# Patient Record
Sex: MALE | Race: WHITE | Employment: FULL TIME | URBAN - METROPOLITAN AREA
[De-identification: names, ages, dates, MRNs, and addresses within clinical notes are randomized per-mention and may not be internally consistent; named-entity substitution may affect disease eponyms.]

---

## 2017-01-13 ENCOUNTER — TELEPHONE (OUTPATIENT)
Dept: FAMILY MEDICINE | Facility: CLINIC | Age: 37
End: 2017-01-13

## 2017-01-13 DIAGNOSIS — S83.206A RIGHT KNEE MENISCAL TEAR: Primary | ICD-10-CM

## 2017-01-13 NOTE — TELEPHONE ENCOUNTER
Pt is having surgery with Dr. Raymundo Hawkins with [facility] at [facility] and will need a referral. Thank you.

## 2017-01-16 ENCOUNTER — TELEPHONE (OUTPATIENT)
Dept: FAMILY MEDICINE | Facility: CLINIC | Age: 37
End: 2017-01-16

## 2017-01-16 NOTE — TELEPHONE ENCOUNTER
Panel Management Review      Patient has the following on his problem list:     Hypertension   Last three blood pressure readings:  BP Readings from Last 3 Encounters:   11/29/16 158/100   11/15/16 179/101   09/01/16 164/99     Blood pressure: Failed    HTN Guidelines:  Age 18-59 BP range:  Less than 140/90  Age 60-85 with Diabetes:  Less than 140/90  Age 60-85 without Diabetes:  less than 150/90      Composite cancer screening  Chart review shows that this patient is due/due soon for the following None  Summary:    Patient is due/failing the following:   BP CHECK and PHQ9    Action needed:   Routed to provider for review.    Type of outreach:    None, routed to provider for review.    Questions for provider review:    Patient failing for blood pressure, patient not on hypertension medications.  Would you like patient to have a follow up appointment for blood pressure follow up with you?  Please route back to team with appointment type needed                                                                                                                                     Rodolfo Reddy CMA       Chart routed to Provider .

## 2017-02-01 ENCOUNTER — OFFICE VISIT (OUTPATIENT)
Dept: FAMILY MEDICINE | Facility: CLINIC | Age: 37
End: 2017-02-01
Payer: COMMERCIAL

## 2017-02-01 VITALS
HEART RATE: 81 BPM | DIASTOLIC BLOOD PRESSURE: 88 MMHG | OXYGEN SATURATION: 98 % | BODY MASS INDEX: 39.89 KG/M2 | WEIGHT: 310.8 LBS | TEMPERATURE: 98.6 F | SYSTOLIC BLOOD PRESSURE: 135 MMHG

## 2017-02-01 DIAGNOSIS — Z01.818 PREOP GENERAL PHYSICAL EXAM: Primary | ICD-10-CM

## 2017-02-01 DIAGNOSIS — G43.C1 INTRACTABLE PERIODIC HEADACHE SYNDROME: ICD-10-CM

## 2017-02-01 DIAGNOSIS — R06.83 SNORING: ICD-10-CM

## 2017-02-01 PROCEDURE — 99214 OFFICE O/P EST MOD 30 MIN: CPT | Performed by: FAMILY MEDICINE

## 2017-02-01 RX ORDER — FLUTICASONE PROPIONATE 50 MCG
2 SPRAY, SUSPENSION (ML) NASAL DAILY
Qty: 1 BOTTLE | Refills: 3 | Status: SHIPPED | OUTPATIENT
Start: 2017-02-01 | End: 2018-07-15

## 2017-02-01 RX ORDER — SUMATRIPTAN 50 MG/1
50 TABLET, FILM COATED ORAL
Qty: 6 TABLET | Refills: 3 | Status: SHIPPED | OUTPATIENT
Start: 2017-02-01 | End: 2018-02-21

## 2017-02-01 ASSESSMENT — ANXIETY QUESTIONNAIRES
7. FEELING AFRAID AS IF SOMETHING AWFUL MIGHT HAPPEN: NOT AT ALL
3. WORRYING TOO MUCH ABOUT DIFFERENT THINGS: NOT AT ALL
1. FEELING NERVOUS, ANXIOUS, OR ON EDGE: NOT AT ALL
IF YOU CHECKED OFF ANY PROBLEMS ON THIS QUESTIONNAIRE, HOW DIFFICULT HAVE THESE PROBLEMS MADE IT FOR YOU TO DO YOUR WORK, TAKE CARE OF THINGS AT HOME, OR GET ALONG WITH OTHER PEOPLE: NOT DIFFICULT AT ALL
5. BEING SO RESTLESS THAT IT IS HARD TO SIT STILL: NOT AT ALL
GAD7 TOTAL SCORE: 1
6. BECOMING EASILY ANNOYED OR IRRITABLE: NOT AT ALL
2. NOT BEING ABLE TO STOP OR CONTROL WORRYING: NOT AT ALL

## 2017-02-01 ASSESSMENT — PATIENT HEALTH QUESTIONNAIRE - PHQ9: 5. POOR APPETITE OR OVEREATING: SEVERAL DAYS

## 2017-02-01 NOTE — MR AVS SNAPSHOT
After Visit Summary   2017    Connor Bowman    MRN: [id number]           Patient Information     Date Of Birth          1980        Visit Information        Provider Department      2017 1:30 PM Jordon Rothman MD [facility]        Today's Diagnoses     Preop general physical exam    -  1     Intractable periodic headache syndrome           Care Instructions      Before Your Surgery      Call your surgeon if there is any change in your health. This includes signs of a cold or flu (such as a sore throat, runny nose, cough, rash or fever).    Do not smoke, drink alcohol or take over the counter medicine (unless your surgeon or primary care doctor tells you to) for the 24 hours before and after surgery.    If you take prescribed drugs: Follow your doctor s orders about which medicines to take and which to stop until after surgery.    Eating and drinking prior to surgery: follow the instructions from your surgeon    Take a shower or bath the night before surgery. Use the soap your surgeon gave you to gently clean your skin. If you do not have soap from your surgeon, use your regular soap. Do not shave or scrub the surgery site.  Wear clean pajamas and have clean sheets on your bed.       [facility]  [address] [address]  [phone]  Dept: [phone]    PRE-OP EVALUATION:  Today's date: 2017    Connor Bowman (: 1980) presents for pre-operative evaluation assessment as requested by Dr. Raymundo Hawkins .  He requires evaluation and anesthesia risk assessment prior to undergoing surgery/procedure for treatment of right knee .  Proposed procedure: Arthroscopy    Date of Surgery/ Procedure: 17  Time of Surgery/ Procedure: 1:00  Hospital/Surgical Facility: [facility]   Fax number for surgical facility: [phone]  Primary Physician: Dudley Fontana  Type of Anesthesia Anticipated: to be  determined    Patient has a Health Care Directive or Living Will:  NO    1. NO - Do you have a history of heart attack, stroke, stent, bypass or surgery on an artery in the head, neck, heart or legs?  2. NO - Do you ever have any pain or discomfort in your chest?  3. NO - Do you have a history of  Heart Failure?  4. NO - Are you troubled by shortness of breath when: walking on the level, up a slight hill or at night?  5. NO - Do you currently have a cold, bronchitis or other respiratory infection?  6. NO - Do you have a cough, shortness of breath or wheezing?  7. NO - Do you sometimes get pains in the calves of your legs when you walk?  8. NO - Do you or anyone in your family have previous history of blood clots?  9. NO - Do you or does anyone in your family have a serious bleeding problem such as prolonged bleeding following surgeries or cuts?  10. NO - Have you ever had problems with anemia or been told to take iron pills?  11. NO - Have you had any abnormal blood loss such as black, tarry or bloody stools, or abnormal vaginal bleeding?  12. NO - Have you ever had a blood transfusion?  13. NO - Have you or any of your relatives ever had problems with anesthesia?  14. NO - Do you have sleep apnea, excessive snoring or daytime drowsiness?  15. NO - Do you have any prosthetic heart valves?  16. NO - Do you have prosthetic joints?  17. NO - Is there any chance that you may be pregnant?      HPI:                                                      Brief HPI related to upcoming procedure: right medial knee pain      HYPERTENSION - Patient has longstanding history of mod-severe HTN , currently denies any symptoms referable to elevated blood pressure. Specifically denies chest pain, palpitations, dyspnea, orthopnea, PND or peripheral edema. Blood pressure readings have been in normal range. He is losing weight and exercises                                                                                                             .    MEDICAL HISTORY:                                                      Patient Active Problem List    Diagnosis Date Noted     Tear of medial meniscus of right knee, current, unspecified tear type, subsequent encounter 12/05/2016     Priority: Medium     Derangement of posterior horn of medial meniscus 02/15/2012     Priority: Medium     Migraines 01/02/2012     Priority: Medium     Knee pain 01/02/2012     Priority: Medium     CARDIOVASCULAR SCREENING; LDL GOAL LESS THAN 130 10/31/2010     Priority: Medium     Morbid obesity (H) 04/29/2010     Priority: Medium     Hypertension, goal below 140/90      Priority: Medium     Adiposity 02/16/2007     Priority: Medium      Past Medical History   Diagnosis Date     GERD (gastroesophageal reflux disease)      Unspecified essential hypertension      Past Surgical History   Procedure Laterality Date     Gi surgery  5/2010     Gastric band     Knee surgery       arthroscopic     Current Outpatient Prescriptions   Medication Sig Dispense Refill     aspirin 325 MG tablet Take 325 mg by mouth       fluticasone (FLONASE) 50 MCG/ACT nasal spray        SUMAtriptan (IMITREX) 50 MG tablet Take 1 tablet (50 mg) by mouth at onset of headache for migraine May repeat dose in 2 hours.  Do not exceed 200 mg in 24 hours 6 tablet 1     OTC products: None, except as noted above    No Known Allergies   Latex Allergy: NO    Social History   Substance Use Topics     Smoking status: Never Smoker      Smokeless tobacco: Never Used     Alcohol Use: No     History   Drug Use No       REVIEW OF SYSTEMS:                                                    C: NEGATIVE for fever, chills, change in weight  I: NEGATIVE for worrisome rashes, moles or lesions  E: NEGATIVE for vision changes or irritation  E/M: NEGATIVE for ear, mouth and throat problems  R: NEGATIVE for significant cough or SOB  B: NEGATIVE for masses, tenderness or discharge  CV: NEGATIVE for chest pain, palpitations or  peripheral edema  GI: NEGATIVE for nausea, abdominal pain, heartburn, or change in bowel habits  : NEGATIVE for frequency, dysuria, or hematuria  M: NEGATIVE for significant arthralgias or myalgia  N: NEGATIVE for weakness, dizziness or paresthesias  E: NEGATIVE for temperature intolerance, skin/hair changes  H: NEGATIVE for bleeding problems  P: NEGATIVE for changes in mood or affect    EXAM:                                                    /88 mmHg  Pulse 81  Temp(Src) 98.6  F (37  C) (Oral)  Wt 310 lb 12.8 oz (140.978 kg)  SpO2 98%    GENERAL APPEARANCE: healthy, alert and no distress     EYES: EOMI, - PERRL     HENT: ear canals and TM's normal and nose and mouth without ulcers or lesions     NECK: no adenopathy, no asymmetry, masses, or scars and thyroid normal to palpation     RESP: lungs clear to auscultation - no rales, rhonchi or wheezes     CV: regular rates and rhythm, normal S1 S2, no S3 or S4 and no murmur, click or rub -     ABDOMEN:  soft, nontender, no HSM or masses and bowel sounds normal     MS: extremities normal- no gross deformities noted, no evidence of inflammation in joints, FROM in all extremities.     SKIN: no suspicious lesions or rashes     NEURO: Normal strength and tone, sensory exam grossly normal, mentation intact and speech normal     PSYCH: mentation appears normal. and affect normal/bright     LYMPHATICS: No axillary, cervical, inguinal, or supraclavicular nodes    DIAGNOSTICS:                                                    No labs or EKG required for low risk surgery (cataract, skin procedure, breast biopsy, etc)    Recent Labs   Lab Test  02/03/12   1448  10/31/11   1524  04/21/10   1536   HGB   --   16.1  16.1   PLT   --   257   --    NA  141  138   --    POTASSIUM  4.7  3.9   --    CR  1.02  1.03   --         IMPRESSION:                                                    Reason for surgery/procedure: right MMT  Diagnosis/reason for consult: Anesthesia     The  proposed surgical procedure is considered LOW risk.    REVISED CARDIAC RISK INDEX  The patient has the following serious cardiovascular risks for perioperative complications such as (MI, PE, VFib and 3  AV Block):  No serious cardiac risks  INTERPRETATION: 0 risks: Class I (very low risk - 0.4% complication rate)    The patient has the following additional risks for perioperative complications:  No identified additional risks      ICD-10-CM    1. Preop general physical exam Z01.818    2. Intractable periodic headache syndrome G43.C1 SUMAtriptan (IMITREX) 50 MG tablet       RECOMMENDATIONS:                                                           --Patient is to take all scheduled medications on the day of surgery EXCEPT for modifications listed below.    APPROVAL GIVEN to proceed with proposed procedure, without further diagnostic evaluation       Signed Electronically by: Jordon Rothman MD    Copy of this evaluation report is provided to requesting physician.    [facility] Preop Guidelines          Follow-ups after your visit        Who to contact     If you have questions or need follow up information about today's clinic visit or your schedule please contact [facility] directly at [phone].  Normal or non-critical lab and imaging results will be communicated to you by [facility], letter or phone within 4 business days after the clinic has received the results. If you do not hear from us within 7 days, please contact the clinic through [facility] or phone. If you have a critical or abnormal lab result, we will notify you by phone as soon as possible.  Submit refill requests through [facility] or call your pharmacy and they will forward the refill request to us. Please allow 3 business days for your refill to be completed.          Additional Information About Your Visit        [employer] Information     [employer] lets you send messages to your doctor, view your test results, renew your prescriptions,  "schedule appointments and more. To sign up, go to [email or web address] . Click on \"Log in\" on the left side of the screen, which will take you to the Welcome page. Then click on \"Sign up Now\" on the right side of the page.     You will be asked to enter the access code listed below, as well as some personal information. Please follow the directions to create your username and password.     Your access code is: [email or web address]  Expires: 2017  8:58 AM     Your access code will  in 90 days. If you need help or a new code, please call your [facility] clinic or [phone].        Care EveryWhere ID     This is your Care EveryWhere ID. This could be used by other organizations to access your [facility] medical records  [id number]        Your Vitals Were     Pulse Temperature Pulse Oximetry             81 98.6  F (37  C) (Oral) 98%          Blood Pressure from Last 3 Encounters:   17 135/88   16 158/100   11/15/16 179/101    Weight from Last 3 Encounters:   17 310 lb 12.8 oz (140.978 kg)   16 320 lb (145.151 kg)   11/15/16 320 lb (145.151 kg)              Today, you had the following     No orders found for display       Primary Care Provider Office Phone # Fax #    Dudley Fontana [phone]       [facility] [address] [place] [address]        Thank you!     Thank you for choosing [facility]  for your care. Our goal is always to provide you with excellent care. Hearing back from our patients is one way we can continue to improve our services. Please take a few minutes to complete the written survey that you may receive in the mail after your visit with us. Thank you!             Your Updated Medication List - Protect others around you: Learn how to safely use, store and throw away your medicines at www.disposemymeds.org.          This list is accurate as of: 17  1:58 PM.  Always use your most recent med list.                   " Brand Name Dispense Instructions for use    aspirin 325 MG tablet      Take 325 mg by mouth       FLONASE 50 MCG/ACT spray   Generic drug:  fluticasone          SUMAtriptan 50 MG tablet    IMITREX    6 tablet    Take 1 tablet (50 mg) by mouth at onset of headache for migraine May repeat dose in 2 hours.  Do not exceed 200 mg in 24 hours

## 2017-02-01 NOTE — PROGRESS NOTES
[facility]  [address] [facility] [address]  [phone]  Dept: [phone]    PRE-OP EVALUATION:  Today's date: 2017    Connor Bowman (: 1980) presents for pre-operative evaluation assessment as requested by Dr. Raymundo Hawkins .  He requires evaluation and anesthesia risk assessment prior to undergoing surgery/procedure for treatment of right knee .  Proposed procedure: Arthroscopy    Date of Surgery/ Procedure: 17  Time of Surgery/ Procedure: 1:00  Hospital/Surgical Facility: [facility]   Fax number for surgical facility: [phone]  Primary Physician: Dudley Fontana  Type of Anesthesia Anticipated: to be determined    Patient has a Health Care Directive or Living Will:  NO    1. NO - Do you have a history of heart attack, stroke, stent, bypass or surgery on an artery in the head, neck, heart or legs?  2. NO - Do you ever have any pain or discomfort in your chest?  3. NO - Do you have a history of  Heart Failure?  4. NO - Are you troubled by shortness of breath when: walking on the level, up a slight hill or at night?  5. NO - Do you currently have a cold, bronchitis or other respiratory infection?  6. NO - Do you have a cough, shortness of breath or wheezing?  7. NO - Do you sometimes get pains in the calves of your legs when you walk?  8. NO - Do you or anyone in your family have previous history of blood clots?  9. NO - Do you or does anyone in your family have a serious bleeding problem such as prolonged bleeding following surgeries or cuts?  10. NO - Have you ever had problems with anemia or been told to take iron pills?  11. NO - Have you had any abnormal blood loss such as black, tarry or bloody stools, or abnormal vaginal bleeding?  12. NO - Have you ever had a blood transfusion?  13. NO - Have you or any of your relatives ever had problems with anesthesia?  14. NO - Do you have sleep apnea, excessive snoring or daytime drowsiness?  15. NO - Do  you have any prosthetic heart valves?  16. NO - Do you have prosthetic joints?  17. NO - Is there any chance that you may be pregnant?      HPI:                                                      Brief HPI related to upcoming procedure: right medial knee pain      HYPERTENSION - Patient has longstanding history of mod-severe HTN , currently denies any symptoms referable to elevated blood pressure. Specifically denies chest pain, palpitations, dyspnea, orthopnea, PND or peripheral edema. Blood pressure readings have been in normal range. He is losing weight and exercises                                                                                                            .    MEDICAL HISTORY:                                                      Patient Active Problem List    Diagnosis Date Noted     Tear of medial meniscus of right knee, current, unspecified tear type, subsequent encounter 12/05/2016     Priority: Medium     Derangement of posterior horn of medial meniscus 02/15/2012     Priority: Medium     Migraines 01/02/2012     Priority: Medium     Knee pain 01/02/2012     Priority: Medium     CARDIOVASCULAR SCREENING; LDL GOAL LESS THAN 130 10/31/2010     Priority: Medium     Morbid obesity (H) 04/29/2010     Priority: Medium     Hypertension, goal below 140/90      Priority: Medium     Adiposity 02/16/2007     Priority: Medium      Past Medical History   Diagnosis Date     GERD (gastroesophageal reflux disease)      Unspecified essential hypertension      Past Surgical History   Procedure Laterality Date     Gi surgery  5/2010     Gastric band     Knee surgery       arthroscopic     Current Outpatient Prescriptions   Medication Sig Dispense Refill     aspirin 325 MG tablet Take 325 mg by mouth       fluticasone (FLONASE) 50 MCG/ACT nasal spray        SUMAtriptan (IMITREX) 50 MG tablet Take 1 tablet (50 mg) by mouth at onset of headache for migraine May repeat dose in 2 hours.  Do not exceed 200 mg in 24  hours 6 tablet 1     OTC products: None, except as noted above    No Known Allergies   Latex Allergy: NO    Social History   Substance Use Topics     Smoking status: Never Smoker      Smokeless tobacco: Never Used     Alcohol Use: No     History   Drug Use No       REVIEW OF SYSTEMS:                                                    C: NEGATIVE for fever, chills, change in weight  I: NEGATIVE for worrisome rashes, moles or lesions  E: NEGATIVE for vision changes or irritation  E/M: NEGATIVE for ear, mouth and throat problems  R: NEGATIVE for significant cough or SOB  B: NEGATIVE for masses, tenderness or discharge  CV: NEGATIVE for chest pain, palpitations or peripheral edema  GI: NEGATIVE for nausea, abdominal pain, heartburn, or change in bowel habits  : NEGATIVE for frequency, dysuria, or hematuria  M: NEGATIVE for significant arthralgias or myalgia  N: NEGATIVE for weakness, dizziness or paresthesias  E: NEGATIVE for temperature intolerance, skin/hair changes  H: NEGATIVE for bleeding problems  P: NEGATIVE for changes in mood or affect    EXAM:                                                    /88 mmHg  Pulse 81  Temp(Src) 98.6  F (37  C) (Oral)  Wt 310 lb 12.8 oz (140.978 kg)  SpO2 98%    GENERAL APPEARANCE: healthy, alert and no distress     EYES: EOMI, - PERRL     HENT: ear canals and TM's normal and nose and mouth without ulcers or lesions     NECK: no adenopathy, no asymmetry, masses, or scars and thyroid normal to palpation     RESP: lungs clear to auscultation - no rales, rhonchi or wheezes     CV: regular rates and rhythm, normal S1 S2, no S3 or S4 and no murmur, click or rub -     ABDOMEN:  soft, nontender, no HSM or masses and bowel sounds normal     MS: extremities normal- no gross deformities noted, no evidence of inflammation in joints, FROM in all extremities.     SKIN: no suspicious lesions or rashes     NEURO: Normal strength and tone, sensory exam grossly normal, mentation intact  and speech normal     PSYCH: mentation appears normal. and affect normal/bright     LYMPHATICS: No axillary, cervical, inguinal, or supraclavicular nodes    DIAGNOSTICS:                                                    No labs or EKG required for low risk surgery (cataract, skin procedure, breast biopsy, etc)    Recent Labs   Lab Test  02/03/12   1448  10/31/11   1524  04/21/10   1536   HGB   --   16.1  16.1   PLT   --   257   --    NA  141  138   --    POTASSIUM  4.7  3.9   --    CR  1.02  1.03   --         IMPRESSION:                                                    Reason for surgery/procedure: right MMT  Diagnosis/reason for consult: Anesthesia     The proposed surgical procedure is considered LOW risk.    REVISED CARDIAC RISK INDEX  The patient has the following serious cardiovascular risks for perioperative complications such as (MI, PE, VFib and 3  AV Block):  No serious cardiac risks  INTERPRETATION: 0 risks: Class I (very low risk - 0.4% complication rate)    The patient has the following additional risks for perioperative complications:  No identified additional risks      ICD-10-CM    1. Preop general physical exam Z01.818    2. Intractable periodic headache syndrome G43.C1 SUMAtriptan (IMITREX) 50 MG tablet       RECOMMENDATIONS:                                                           --Patient is to take all scheduled medications on the day of surgery EXCEPT for modifications listed below.    APPROVAL GIVEN to proceed with proposed procedure, without further diagnostic evaluation       Signed Electronically by: Jordon Rothman MD    Copy of this evaluation report is provided to requesting physician.    [facility] Preop Guidelines

## 2017-02-01 NOTE — PATIENT INSTRUCTIONS
Before Your Surgery      Call your surgeon if there is any change in your health. This includes signs of a cold or flu (such as a sore throat, runny nose, cough, rash or fever).    Do not smoke, drink alcohol or take over the counter medicine (unless your surgeon or primary care doctor tells you to) for the 24 hours before and after surgery.    If you take prescribed drugs: Follow your doctor s orders about which medicines to take and which to stop until after surgery.    Eating and drinking prior to surgery: follow the instructions from your surgeon    Take a shower or bath the night before surgery. Use the soap your surgeon gave you to gently clean your skin. If you do not have soap from your surgeon, use your regular soap. Do not shave or scrub the surgery site.  Wear clean pajamas and have clean sheets on your bed.       [facility]  [id number] [place] [id number]  [phone]  Dept: [phone]    PRE-OP EVALUATION:  Today's date: 2017    Connor Bowman (: 1980) presents for pre-operative evaluation assessment as requested by Dr. Raymundo Hawkins .  He requires evaluation and anesthesia risk assessment prior to undergoing surgery/procedure for treatment of right knee .  Proposed procedure: Arthroscopy    Date of Surgery/ Procedure: 17  Time of Surgery/ Procedure: 1:00  Hospital/Surgical Facility: [facility]   Fax number for surgical facility: [phone]  Primary Physician: Dudley Fontana  Type of Anesthesia Anticipated: to be determined    Patient has a Health Care Directive or Living Will:  NO    1. NO - Do you have a history of heart attack, stroke, stent, bypass or surgery on an artery in the head, neck, heart or legs?  2. NO - Do you ever have any pain or discomfort in your chest?  3. NO - Do you have a history of  Heart Failure?  4. NO - Are you troubled by shortness of breath when: walking on the level, up a slight hill or at night?  5. NO - Do  you currently have a cold, bronchitis or other respiratory infection?  6. NO - Do you have a cough, shortness of breath or wheezing?  7. NO - Do you sometimes get pains in the calves of your legs when you walk?  8. NO - Do you or anyone in your family have previous history of blood clots?  9. NO - Do you or does anyone in your family have a serious bleeding problem such as prolonged bleeding following surgeries or cuts?  10. NO - Have you ever had problems with anemia or been told to take iron pills?  11. NO - Have you had any abnormal blood loss such as black, tarry or bloody stools, or abnormal vaginal bleeding?  12. NO - Have you ever had a blood transfusion?  13. NO - Have you or any of your relatives ever had problems with anesthesia?  14. NO - Do you have sleep apnea, excessive snoring or daytime drowsiness?  15. NO - Do you have any prosthetic heart valves?  16. NO - Do you have prosthetic joints?  17. NO - Is there any chance that you may be pregnant?      HPI:                                                      Brief HPI related to upcoming procedure: right medial knee pain      HYPERTENSION - Patient has longstanding history of mod-severe HTN , currently denies any symptoms referable to elevated blood pressure. Specifically denies chest pain, palpitations, dyspnea, orthopnea, PND or peripheral edema. Blood pressure readings have been in normal range. He is losing weight and exercises                                                                                                            .    MEDICAL HISTORY:                                                      Patient Active Problem List    Diagnosis Date Noted     Tear of medial meniscus of right knee, current, unspecified tear type, subsequent encounter 12/05/2016     Priority: Medium     Derangement of posterior horn of medial meniscus 02/15/2012     Priority: Medium     Migraines 01/02/2012     Priority: Medium     Knee pain 01/02/2012     Priority:  Medium     CARDIOVASCULAR SCREENING; LDL GOAL LESS THAN 130 10/31/2010     Priority: Medium     Morbid obesity (H) 04/29/2010     Priority: Medium     Hypertension, goal below 140/90      Priority: Medium     Adiposity 02/16/2007     Priority: Medium      Past Medical History   Diagnosis Date     GERD (gastroesophageal reflux disease)      Unspecified essential hypertension      Past Surgical History   Procedure Laterality Date     Gi surgery  5/2010     Gastric band     Knee surgery       arthroscopic     Current Outpatient Prescriptions   Medication Sig Dispense Refill     aspirin 325 MG tablet Take 325 mg by mouth       fluticasone (FLONASE) 50 MCG/ACT nasal spray        SUMAtriptan (IMITREX) 50 MG tablet Take 1 tablet (50 mg) by mouth at onset of headache for migraine May repeat dose in 2 hours.  Do not exceed 200 mg in 24 hours 6 tablet 1     OTC products: None, except as noted above    No Known Allergies   Latex Allergy: NO    Social History   Substance Use Topics     Smoking status: Never Smoker      Smokeless tobacco: Never Used     Alcohol Use: No     History   Drug Use No       REVIEW OF SYSTEMS:                                                    C: NEGATIVE for fever, chills, change in weight  I: NEGATIVE for worrisome rashes, moles or lesions  E: NEGATIVE for vision changes or irritation  E/M: NEGATIVE for ear, mouth and throat problems  R: NEGATIVE for significant cough or SOB  B: NEGATIVE for masses, tenderness or discharge  CV: NEGATIVE for chest pain, palpitations or peripheral edema  GI: NEGATIVE for nausea, abdominal pain, heartburn, or change in bowel habits  : NEGATIVE for frequency, dysuria, or hematuria  M: NEGATIVE for significant arthralgias or myalgia  N: NEGATIVE for weakness, dizziness or paresthesias  E: NEGATIVE for temperature intolerance, skin/hair changes  H: NEGATIVE for bleeding problems  P: NEGATIVE for changes in mood or affect    EXAM:                                                     /88 mmHg  Pulse 81  Temp(Src) 98.6  F (37  C) (Oral)  Wt 310 lb 12.8 oz (140.978 kg)  SpO2 98%    GENERAL APPEARANCE: healthy, alert and no distress     EYES: EOMI, - PERRL     HENT: ear canals and TM's normal and nose and mouth without ulcers or lesions     NECK: no adenopathy, no asymmetry, masses, or scars and thyroid normal to palpation     RESP: lungs clear to auscultation - no rales, rhonchi or wheezes     CV: regular rates and rhythm, normal S1 S2, no S3 or S4 and no murmur, click or rub -     ABDOMEN:  soft, nontender, no HSM or masses and bowel sounds normal     MS: extremities normal- no gross deformities noted, no evidence of inflammation in joints, FROM in all extremities.     SKIN: no suspicious lesions or rashes     NEURO: Normal strength and tone, sensory exam grossly normal, mentation intact and speech normal     PSYCH: mentation appears normal. and affect normal/bright     LYMPHATICS: No axillary, cervical, inguinal, or supraclavicular nodes    DIAGNOSTICS:                                                    No labs or EKG required for low risk surgery (cataract, skin procedure, breast biopsy, etc)    Recent Labs   Lab Test  02/03/12   1448  10/31/11   1524  04/21/10   1536   HGB   --   16.1  16.1   PLT   --   257   --    NA  141  138   --    POTASSIUM  4.7  3.9   --    CR  1.02  1.03   --         IMPRESSION:                                                    Reason for surgery/procedure: right MMT  Diagnosis/reason for consult: Anesthesia     The proposed surgical procedure is considered LOW risk.    REVISED CARDIAC RISK INDEX  The patient has the following serious cardiovascular risks for perioperative complications such as (MI, PE, VFib and 3  AV Block):  No serious cardiac risks  INTERPRETATION: 0 risks: Class I (very low risk - 0.4% complication rate)    The patient has the following additional risks for perioperative complications:  No identified additional risks       ICD-10-CM    1. Preop general physical exam Z01.818    2. Intractable periodic headache syndrome G43.C1 SUMAtriptan (IMITREX) 50 MG tablet       RECOMMENDATIONS:                                                           --Patient is to take all scheduled medications on the day of surgery EXCEPT for modifications listed below.    APPROVAL GIVEN to proceed with proposed procedure, without further diagnostic evaluation       Signed Electronically by: Jorodn Rothman MD    Copy of this evaluation report is provided to requesting physician.    [facility] Preop Guidelines

## 2017-02-02 ASSESSMENT — PATIENT HEALTH QUESTIONNAIRE - PHQ9: SUM OF ALL RESPONSES TO PHQ QUESTIONS 1-9: 0

## 2017-02-02 ASSESSMENT — ANXIETY QUESTIONNAIRES: GAD7 TOTAL SCORE: 1

## 2017-09-17 ENCOUNTER — OFFICE VISIT (OUTPATIENT)
Dept: URGENT CARE | Facility: RETAIL CLINIC | Age: 37
End: 2017-09-17
Payer: COMMERCIAL

## 2017-09-17 VITALS
HEART RATE: 93 BPM | OXYGEN SATURATION: 97 % | SYSTOLIC BLOOD PRESSURE: 154 MMHG | DIASTOLIC BLOOD PRESSURE: 95 MMHG | TEMPERATURE: 98.8 F

## 2017-09-17 DIAGNOSIS — J01.90 ACUTE SINUSITIS WITH SYMPTOMS > 10 DAYS: Primary | ICD-10-CM

## 2017-09-17 DIAGNOSIS — R03.0 ELEVATED BLOOD PRESSURE READING WITHOUT DIAGNOSIS OF HYPERTENSION: ICD-10-CM

## 2017-09-17 DIAGNOSIS — R05.9 COUGH: ICD-10-CM

## 2017-09-17 PROCEDURE — 99203 OFFICE O/P NEW LOW 30 MIN: CPT | Performed by: PHYSICIAN ASSISTANT

## 2017-09-17 RX ORDER — FLUTICASONE PROPIONATE 50 MCG
2 SPRAY, SUSPENSION (ML) NASAL DAILY
Qty: 1 BOTTLE | Refills: 6 | Status: SHIPPED | OUTPATIENT
Start: 2017-09-17 | End: 2018-09-18

## 2017-09-17 RX ORDER — CODEINE PHOSPHATE AND GUAIFENESIN 10; 100 MG/5ML; MG/5ML
1-2 SOLUTION ORAL EVERY 6 HOURS PRN
Qty: 120 ML | Refills: 0 | Status: SHIPPED | OUTPATIENT
Start: 2017-09-17 | End: 2017-10-05

## 2017-09-17 NOTE — PATIENT INSTRUCTIONS
Take antibiotic as directed  Cough codeine syrup as directed  Flonase steroid nasal spray daily as needed  Apply warm facial compresses/packs for 5-10 minutes three times daily.  Drink plenty of fluids- 6 to 10 glasses of liquids each day. Rest.  Saline drops or nasal sprays as needed.   Steam treatments or humidifier.  Sudafed (decongestant) for congestion.  Mucinex (guaifenesin) to thin out secretions.  Tylenol or ibuprofen as needed for pain or fever  Follow up at your primary care clinic for increasing pain, high fever, vision changes, worsening symptoms, or no relief from symptoms after 7-10 days.  Please follow up with primary care provider if not improving, worsening or new symptoms or for any adverse reactions to medications.     Recheck blood pressure at home in 1-2 weeks  Monitor   Goal <140/<90  Perfect Goal <120/<80

## 2017-09-17 NOTE — NURSING NOTE
"Chief Complaint   Patient presents with     Cough     2 weeks; hurts in head when coughing     Sinus Problem     Headache     constant sinus head pressure; 1 week     Plugged Ears     feels like he is under water       Initial BP (!) 154/95 (BP Location: Left arm)  Pulse 93  Temp 98.8  F (37.1  C) (Oral)  SpO2 97% Estimated body mass index is 39.9 kg/(m^2) as calculated from the following:    Height as of 11/29/16: 6' 2\" (1.88 m).    Weight as of 2/1/17: 310 lb 12.8 oz (141 kg).  Medication Reconciliation: complete  "

## 2017-09-17 NOTE — MR AVS SNAPSHOT
"              After Visit Summary   9/17/2017    Connor Bowman    MRN: [id number]           Patient Information     Date Of Birth          1980        Visit Information        Provider Department      9/17/2017 1:50 PM Taylor Ayala PA-C [facility]        Today's Diagnoses     Acute sinusitis with symptoms > 10 days    -  1    Cough          Care Instructions    Take antibiotic as directed  Cough codeine syrup as directed  Flonase steroid nasal spray daily as needed  Apply warm facial compresses/packs for 5-10 minutes three times daily.  Drink plenty of fluids- 6 to 10 glasses of liquids each day. Rest.  Saline drops or nasal sprays as needed.   Steam treatments or humidifier.  Sudafed (decongestant) for congestion.  Mucinex (guaifenesin) to thin out secretions.  Tylenol or ibuprofen as needed for pain or fever  Follow up at your primary care clinic for increasing pain, high fever, vision changes, worsening symptoms, or no relief from symptoms after 7-10 days.  Please follow up with primary care provider if not improving, worsening or new symptoms or for any adverse reactions to medications.     Recheck blood pressure at home in 1-2 weeks  Monitor   Goal <140/<90  Perfect Goal <120/<80                  Follow-ups after your visit        Who to contact     You can reach your care team any time of the day by calling [phone].  Notification of test results:  If you have an abnormal lab result, we will notify you by phone as soon as possible.         Additional Information About Your Visit        MyChart Information     [employer] lets you send messages to your doctor, view your test results, renew your prescriptions, schedule appointments and more. To sign up, go to [email or web address] . Click on \"Log in\" on the left side of the screen, which will take you to the Welcome page. Then click on \"Sign up Now\" on the right side of the page.     You will be asked to enter the access " code listed below, as well as some personal information. Please follow the directions to create your username and password.     Your access code is: [email or web address]  Expires: 2017  2:31 PM     Your access code will  in 90 days. If you need help or a new code, please call your [facility] clinic or [phone].        Care EveryWhere ID     This is your Care EveryWhere ID. This could be used by other organizations to access your [facility] medical records  [id number]        Your Vitals Were     Pulse Temperature Pulse Oximetry             93 98.8  F (37.1  C) (Oral) 97%          Blood Pressure from Last 3 Encounters:   17 (!) 154/95   17 135/88   16 (!) 158/100    Weight from Last 3 Encounters:   17 (!) 310 lb 12.8 oz (141 kg)   16 (!) 320 lb (145.2 kg)   11/15/16 (!) 320 lb (145.2 kg)              Today, you had the following     No orders found for display         Today's Medication Changes          These changes are accurate as of: 17  2:31 PM.  If you have any questions, ask your nurse or doctor.               Start taking these medicines.        Dose/Directions    amoxicillin-clavulanate 875-125 MG per tablet   Commonly known as:  AUGMENTIN   Used for:  Acute sinusitis with symptoms > 10 days        Dose:  1 tablet   Take 1 tablet by mouth 2 times daily for 10 days   Quantity:  20 tablet   Refills:  0       guaiFENesin-codeine 100-10 MG/5ML Soln solution   Commonly known as:  ROBITUSSIN AC   Used for:  Cough        Dose:  1-2 tsp.   Take 5-10 mLs by mouth every 6 hours as needed for cough   Quantity:  120 mL   Refills:  0         These medicines have changed or have updated prescriptions.        Dose/Directions    * fluticasone 50 MCG/ACT spray   Commonly known as:  FLONASE   This may have changed:  Another medication with the same name was added. Make sure you understand how and when to take each.   Used for:  Snoring        Dose:  2 spray   Spray 2 sprays into both  nostrils daily   Quantity:  1 Bottle   Refills:  3       * fluticasone 50 MCG/ACT spray   Commonly known as:  FLONASE   This may have changed:  You were already taking a medication with the same name, and this prescription was added. Make sure you understand how and when to take each.   Used for:  Acute sinusitis with symptoms > 10 days        Dose:  2 spray   Spray 2 sprays into both nostrils daily   Quantity:  1 Bottle   Refills:  6       * Notice:  This list has 2 medication(s) that are the same as other medications prescribed for you. Read the directions carefully, and ask your doctor or other care provider to review them with you.         Where to get your medicines      These medications were sent to [id number] - [place], [place] - [address] [facility]  [address] [facility] [address]     Phone:  [phone]     amoxicillin-clavulanate 875-125 MG per tablet    fluticasone 50 MCG/ACT spray         Some of these will need a paper prescription and others can be bought over the counter.  Ask your nurse if you have questions.     Bring a paper prescription for each of these medications     guaiFENesin-codeine 100-10 MG/5ML Soln solution                Primary Care Provider Office Phone # Fax #    Dudley Fontana [phone] [address] [place] [address]        Equal Access to Services     FLORENTINO TELLO AH: Hadii nay hinds hadasho Soomaali, waaxda luqadaha, qaybta kaalmada adeegyada, tami marquis haymarni cortez. So [facility] [phone].    ATENCIÓN: Si habla español, tiene a winn disposición servicios gratuitos de asistencia lingüística. Llame al [phone].    We comply with applicable federal civil rights laws and [place] laws. We do not discriminate on the basis of race, color, national origin, age, disability sex, sexual orientation or gender identity.            Thank you!     Thank you for choosing [facility]  for your care. Our goal  is always to provide you with excellent care. Hearing back from our patients is one way we can continue to improve our services. Please take a few minutes to complete the written survey that you may receive in the mail after your visit with us. Thank you!             Your Updated Medication List - Protect others around you: Learn how to safely use, store and throw away your medicines at www.disposemymeds.org.          This list is accurate as of: 9/17/17  2:31 PM.  Always use your most recent med list.                   Brand Name Dispense Instructions for use Diagnosis    amoxicillin-clavulanate 875-125 MG per tablet    AUGMENTIN    20 tablet    Take 1 tablet by mouth 2 times daily for 10 days    Acute sinusitis with symptoms > 10 days       aspirin 325 MG tablet      Take 325 mg by mouth        * fluticasone 50 MCG/ACT spray    FLONASE    1 Bottle    Spray 2 sprays into both nostrils daily    Snoring       * fluticasone 50 MCG/ACT spray    FLONASE    1 Bottle    Spray 2 sprays into both nostrils daily    Acute sinusitis with symptoms > 10 days       guaiFENesin-codeine 100-10 MG/5ML Soln solution    ROBITUSSIN AC    120 mL    Take 5-10 mLs by mouth every 6 hours as needed for cough    Cough       SUMAtriptan 50 MG tablet    IMITREX    6 tablet    Take 1 tablet (50 mg) by mouth at onset of headache for migraine May repeat dose in 2 hours.  Do not exceed 200 mg in 24 hours    Intractable periodic headache syndrome       * Notice:  This list has 2 medication(s) that are the same as other medications prescribed for you. Read the directions carefully, and ask your doctor or other care provider to review them with you.

## 2017-09-17 NOTE — PROGRESS NOTES
Chief Complaint   Patient presents with     Cough     2 weeks; hurts in head when coughing     Sinus Problem     Headache     constant sinus head pressure; 1 week     Plugged Ears     feels like he is under water      SUBJECTIVE:  Connor Bowman is a 37 year old male here with concerns about sinus infection.  He states onset of symptoms were 2 week(s) ago.  He has had maxillary, frontal pressure. Course of illness is worsening. Severity moderate  Current and Associated symptoms: productive cough, sinus pressure, ears full, sometimes crackle, congested, headaches, feverish  Predisposing factors include recent illness. Recent treatment has included: cough suppressant, fluids    Past Medical History:   Diagnosis Date     GERD (gastroesophageal reflux disease)      Unspecified essential hypertension      Current Outpatient Prescriptions   Medication Sig Dispense Refill     fluticasone (FLONASE) 50 MCG/ACT spray Spray 2 sprays into both nostrils daily 1 Bottle 3     SUMAtriptan (IMITREX) 50 MG tablet Take 1 tablet (50 mg) by mouth at onset of headache for migraine May repeat dose in 2 hours.  Do not exceed 200 mg in 24 hours 6 tablet 3     aspirin 325 MG tablet Take 325 mg by mouth        No Known Allergies     History   Smoking Status     Never Smoker   Smokeless Tobacco     Never Used       ROS:  CONSTITUTIONAL: POSITIVE feverish  ENT/MOUTH: POSITIVE for ear pressure, congested, drainage in throat and NEGATIVE for sore throat  RESP:POSITIVE for cough-non productive and NEGATIVE for wheezing    OBJECTIVE:  BP (!) 154/95 (BP Location: Left arm)  Pulse 93  Temp 98.8  F (37.1  C) (Oral)  SpO2 97%  Exam:GENERAL APPEARANCE: healthy, alert and no distress  EYES: conjunctiva clear  HENT: TMs serous effusion, nasal turbinates erythematous, swollen, rhinorrhea yellow, oral mucous membranes moist, no erythema noted, maxillary sinus tenderness. Post nasal drainage noted in the pharynx.  NECK: supple, nontender, no  lymphadenopathy  RESP: lungs clear to auscultation - no rales, rhonchi or wheezes  CV: regular rates and rhythm, normal S1 S2, no murmur noted  SKIN: no suspicious lesions or rashes    ASSESSMENT:  (J01.90) Acute sinusitis with symptoms > 10 days    (R05) Cough  (R03.0) Elevated blood pressure reading without diagnosis of hypertension      PLAN:   amoxicillin-clavulanate (AUGMENTIN) 875-125 MG per tablet   fluticasone (FLONASE) 50 MCG/ACT Spray  guaiFENesin-codeine (ROBITUSSIN AC) 100-10 MG/5ML SOLN solution  Take antibiotic as directed  Cough codeine syrup as needed - caution sedating, discussed  Flonase steroid nasal spray daily as needed  Apply warm facial compresses/packs for 5-10 minutes three times daily.  Drink plenty of fluids- 6 to 10 glasses of liquids each day. Rest.  Saline drops or nasal sprays as needed.   Steam treatments or humidifier.  Sudafed (decongestant) for congestion.  Mucinex (guaifenesin) to thin out secretions.  Tylenol or ibuprofen as needed for pain or fever  Follow up at your primary care clinic for increasing pain, high fever, vision changes, worsening symptoms, or no relief from symptoms after 7-10 days.  Please follow up with primary care provider if not improving, worsening or new symptoms or for any adverse reactions to medications.     Recheck blood pressure at home in 1-2 weeks  Monitor   Goal <140/<90  Perfect Goal <120/<80    Taylor Ayala PA-C  [facility]

## 2017-09-25 ENCOUNTER — APPOINTMENT (OUTPATIENT)
Dept: GENERAL RADIOLOGY | Facility: CLINIC | Age: 37
End: 2017-09-25
Attending: FAMILY MEDICINE
Payer: COMMERCIAL

## 2017-09-25 ENCOUNTER — HOSPITAL ENCOUNTER (EMERGENCY)
Facility: CLINIC | Age: 37
Discharge: HOME | End: 2017-09-25
Attending: EMERGENCY MEDICINE | Admitting: EMERGENCY MEDICINE
Payer: COMMERCIAL

## 2017-09-25 VITALS
WEIGHT: 300 LBS | DIASTOLIC BLOOD PRESSURE: 101 MMHG | HEIGHT: 74 IN | TEMPERATURE: 98.6 F | RESPIRATION RATE: 16 BRPM | SYSTOLIC BLOOD PRESSURE: 151 MMHG | BODY MASS INDEX: 38.5 KG/M2 | OXYGEN SATURATION: 99 %
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
## 2017-09-25 DIAGNOSIS — S90.32XA CONTUSION OF LEFT FOOT, INITIAL ENCOUNTER: ICD-10-CM

## 2017-09-25 PROCEDURE — 99282 EMERGENCY DEPT VISIT SF MDM: CPT | Mod: Z6 | Performed by: EMERGENCY MEDICINE

## 2017-09-25 PROCEDURE — 99283 EMERGENCY DEPT VISIT LOW MDM: CPT | Performed by: EMERGENCY MEDICINE

## 2017-09-25 PROCEDURE — 73630 X-RAY EXAM OF FOOT: CPT | Mod: TC,LT

## 2017-09-25 NOTE — DISCHARGE INSTRUCTIONS
Foot Contusion  You have a contusion. This is also called a bruise. There is swelling and some bleeding under the skin, but no broken bones. This injury generally takes a few days to a few weeks to heal.  During that time, the bruise will typically change in color from reddish, to purple-blue, to greenish-yellow, then to yellow-brown.  Home care    Elevate the foot to reduce pain and swelling. As much as possible, sit or lie down with the foot raised about the level of your heart. This is especially important during the first 48 hours.    Ice the foot to help reduce pain and swelling. Wrap a cold source (ice pack or ice cubes in a plastic bag) in a thin towel. Apply to the bruised area for 20 minutes every 1 to 2 hours the first day. Continue this 3 to 4 times a day until the pain and swelling goes away.    Unless another medicine was prescribed, you can take acetaminophen, ibuprofen, or naproxen to control pain. (If you have chronic liver or kidney disease or ever had a stomach ulcer or gastrointestinal bleeding, talk with your healthcare provider before using these medicines.)  Follow up  Follow up with your healthcare provider or our staff as advised. Call if you are not improving within 1 to 2 weeks.  When to seek medical advice   Call your healthcare provider right away if you have any of the following:    Increased pain or swelling    Foot or leg becomes cold, blue, numb or tingly    Signs of infection: Warmth, drainage, or increased redness or pain around the bruise    Inability to move the injured foot     Frequent bruising for unknown reasons  Date Last Reviewed: 2/1/2017 2000-2017 [employer]. [address] [place] [address], [place] [address]. All rights reserved. This information is not intended as a substitute for professional medical care. Always follow your healthcare professional's instructions.

## 2017-09-25 NOTE — ED AVS SNAPSHOT
[facility] Emergency Department    [address] [place] [address]    Phone:  [phone]    Fax:  [phone]                                       Connor Bowman   MRN: [id number]    Department:  [facility] Emergency Department   Date of Visit:  9/25/2017           Patient Information     Date Of Birth          1980        Your diagnoses for this visit were:     Contusion of left foot, initial encounter        You were seen by David Singh MD.      Follow-up Information     Follow up with Dudley Fontana MD.    Specialty:  Family Practice    Why:  If not improving in 7 days    Contact information:    [address] [place] [address]  [phone]          Discharge Instructions         Foot Contusion  You have a contusion. This is also called a bruise. There is swelling and some bleeding under the skin, but no broken bones. This injury generally takes a few days to a few weeks to heal.  During that time, the bruise will typically change in color from reddish, to purple-blue, to greenish-yellow, then to yellow-brown.  Home care    Elevate the foot to reduce pain and swelling. As much as possible, sit or lie down with the foot raised about the level of your heart. This is especially important during the first 48 hours.    Ice the foot to help reduce pain and swelling. Wrap a cold source (ice pack or ice cubes in a plastic bag) in a thin towel. Apply to the bruised area for 20 minutes every 1 to 2 hours the first day. Continue this 3 to 4 times a day until the pain and swelling goes away.    Unless another medicine was prescribed, you can take acetaminophen, ibuprofen, or naproxen to control pain. (If you have chronic liver or kidney disease or ever had a stomach ulcer or gastrointestinal bleeding, talk with your healthcare provider before using these medicines.)  Follow up  Follow up with your healthcare provider or our staff as advised. Call if you are not  improving within 1 to 2 weeks.  When to seek medical advice   Call your healthcare provider right away if you have any of the following:    Increased pain or swelling    Foot or leg becomes cold, blue, numb or tingly    Signs of infection: Warmth, drainage, or increased redness or pain around the bruise    Inability to move the injured foot     Frequent bruising for unknown reasons  Date Last Reviewed: 2/1/2017 2000-2017 [employer]. [address] [place] [address], [place] [address]. All rights reserved. This information is not intended as a substitute for professional medical care. Always follow your healthcare professional's instructions.          24 Hour Appointment Hotline       To make an appointment at any [facility], call [phone] ([phone]). If you don't have a family doctor or clinic, we will help you find one. [facility] clinics are conveniently located to serve the needs of you and your family.             Review of your medicines      Our records show that you are taking the medicines listed below. If these are incorrect, please call your family doctor or clinic.        Dose / Directions Last dose taken    amoxicillin-clavulanate 875-125 MG per tablet   Commonly known as:  AUGMENTIN   Dose:  1 tablet   Quantity:  20 tablet        Take 1 tablet by mouth 2 times daily for 10 days   Refills:  0        aspirin 325 MG tablet   Dose:  325 mg        Take 325 mg by mouth   Refills:  0        * fluticasone 50 MCG/ACT spray   Commonly known as:  FLONASE   Dose:  2 spray   Quantity:  1 Bottle        Spray 2 sprays into both nostrils daily   Refills:  3        * fluticasone 50 MCG/ACT spray   Commonly known as:  FLONASE   Dose:  2 spray   Quantity:  1 Bottle        Spray 2 sprays into both nostrils daily   Refills:  6        guaiFENesin-codeine 100-10 MG/5ML Soln solution   Commonly known as:  ROBITUSSIN AC   Dose:  1-2 tsp.   Quantity:  120 mL        Take 5-10 mLs by mouth every 6 hours as  "needed for cough   Refills:  0        SUMAtriptan 50 MG tablet   Commonly known as:  IMITREX   Dose:  50 mg   Quantity:  6 tablet        Take 1 tablet (50 mg) by mouth at onset of headache for migraine May repeat dose in 2 hours.  Do not exceed 200 mg in 24 hours   Refills:  3        * Notice:  This list has 2 medication(s) that are the same as other medications prescribed for you. Read the directions carefully, and ask your doctor or other care provider to review them with you.            Procedures and tests performed during your visit     XR Foot Left G/E 3 Views      Orders Needing Specimen Collection     None      Pending Results     Date and Time Order Name Status Description    9/25/2017 1752 XR Foot Left G/E 3 Views Preliminary             Pending Culture Results     No orders found from 9/23/2017 to 9/26/2017.            Pending Results Instructions     If you had any lab results that were not finalized at the time of your Discharge, you can call the ED Lab Result RN at [phone]. You will be contacted by this team for any positive Lab results or changes in treatment. The nurses are available 7 days a week from 10A to 6:30P.  You can leave a message 24 hours per day and they will return your call.        Thank you for choosing [facility]       Thank you for choosing [facility] for your care. Our goal is always to provide you with excellent care. Hearing back from our patients is one way we can continue to improve our services. Please take a few minutes to complete the written survey that you may receive in the mail after you visit with us. Thank you!        [employer] Information     [employer] lets you send messages to your doctor, view your test results, renew your prescriptions, schedule appointments and more. To sign up, go to [email or web address] . Click on \"Log in\" on the left side of the screen, which will take you to the Welcome page. Then click on \"Sign up Now\" on the right side of the page.     You " will be asked to enter the access code listed below, as well as some personal information. Please follow the directions to create your username and password.     Your access code is: [email or web address]  Expires: 2017  2:31 PM     Your access code will  in 90 days. If you need help or a new code, please call your [facility] clinic or [phone].        Care EveryWhere ID     This is your Care EveryWhere ID. This could be used by other organizations to access your [facility] medical records  [id number]        Equal Access to Services     [facility]: Aleisha Jane, thee vang, mauricio avila, tami john . So [facility] [phone].    ATENCIÓN: Si habla español, tiene a winn disposición servicios gratuitos de asistencia lingüística. Sheela al [phone].    We comply with applicable federal civil rights laws and [place] laws. We do not discriminate on the basis of race, color, national origin, age, disability sex, sexual orientation or gender identity.            After Visit Summary       This is your record. Keep this with you and show to your community pharmacist(s) and doctor(s) at your next visit.

## 2017-09-25 NOTE — ED PROVIDER NOTES
"  History     Chief Complaint   Patient presents with     Foot Injury     HPI  Connor Bowman is a 37 year old male who presents with moderate, dull achy pain to the top of the left foot after a desk fell on it today.  The pain is made worse with walking.    I have reviewed the Medications, Allergies, Past Medical and Surgical History, and Social History in the Epic system.       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     Morbid obesity (H)     Adiposity     Tear of medial meniscus of right knee, current, unspecified tear type, subsequent encounter         Review of Systems  All other systems are reviewed and are negative    Physical Exam   BP: (!) 151/101 (large cuff, right arm)  Heart Rate: 70  Temp: 98.6  F (37  C)  Resp: 16  Height: 188 cm (6' 2\")  Weight: 136.1 kg (300 lb)  SpO2: 99 %  Physical Exam   Constitutional: He is oriented to person, place, and time. He appears well-developed and well-nourished. No distress.   HENT:   Head: Normocephalic and atraumatic.   Eyes: No scleral icterus.   Neck: Normal range of motion. Neck supple.   Musculoskeletal:   Top of the left foot is mildly swollen over the region of the proximal lateral metatarsals.  There is no ecchymosis.  No deformity.  Distal CMS intact.   Neurological: He is alert and oriented to person, place, and time.   Skin: Skin is warm and dry. No rash noted. He is not diaphoretic. No erythema. No pallor.       ED Course     ED Course     Procedures             Critical Care time:  none     Results for orders placed or performed during the hospital encounter of 09/25/17   XR Foot Left G/E 3 Views    Narrative    LEFT FOOT THREE OR MORE VIEWS  9/25/2017 6:08 PM      HISTORY: Trauma.    COMPARISON: None.      Impression    IMPRESSION: No acute fracture or dislocation.             "     Labs Ordered and Resulted from Time of ED Arrival Up to the Time of Departure from the ED - No data to display    Assessments & Plan (with Medical Decision Making)     I have reviewed the nursing notes.    I have reviewed the findings, diagnosis, plan and need for follow up with the patient.       New Prescriptions    No medications on file       Final diagnoses:   Contusion of left foot, initial encounter       9/25/2017   [facility] EMERGENCY DEPARTMENT     David Singh MD  09/25/17 2312

## 2017-09-25 NOTE — ED AVS SNAPSHOT
[facility] Emergency Department    [address] [place] [address]    Phone:  [phone]    Fax:  [phone]                                       Connor Bowman   MRN: [id number]    Department:  [facility] Emergency Department   Date of Visit:  9/25/2017           After Visit Summary Signature Page     I have received my discharge instructions, and my questions have been answered. I have discussed any challenges I see with this plan with the nurse or doctor.    ..........................................................................................................................................  Patient/Patient Representative Signature      ..........................................................................................................................................  Patient Representative Print Name and Relationship to Patient    ..................................................               ................................................  Date                                            Time    ..........................................................................................................................................  Reviewed by Signature/Title    ...................................................              ..............................................  Date                                                            Time

## 2017-10-05 ENCOUNTER — OFFICE VISIT (OUTPATIENT)
Dept: OPTOMETRY | Facility: CLINIC | Age: 37
End: 2017-10-05
Payer: COMMERCIAL

## 2017-10-05 DIAGNOSIS — H16.141 PUNCTATE KERATITIS OF RIGHT EYE: ICD-10-CM

## 2017-10-05 DIAGNOSIS — H04.123 DRY EYES: Primary | ICD-10-CM

## 2017-10-05 PROCEDURE — 99202 OFFICE O/P NEW SF 15 MIN: CPT | Performed by: OPTOMETRIST

## 2017-10-05 ASSESSMENT — REFRACTION_CURRENTRX
OS_SPHERE: -3.50
OD_DIAMETER: 14.2
OD_BASECURVE: 8.7
OS_BASECURVE: 8.7
OS_DIAMETER: 14.2
OD_SPHERE: -3.00

## 2017-10-05 ASSESSMENT — VISUAL ACUITY
OS_CC: 20/20
OD_CC+: -1
CORRECTION_TYPE: GLASSES
METHOD: SNELLEN - LINEAR
OD_CC: 20/20

## 2017-10-05 ASSESSMENT — TONOMETRY: IOP_METHOD: APPLANATION

## 2017-10-05 NOTE — PROGRESS NOTES
Chief Complaint   Patient presents with     Eye Problem Right Eye     discharge amd sensitive to light   He has noticed discharge and light sensitivity for one month in the right eye.  He has not been able to wear the contacts in that eye. He has noticed simliar issues in the left eye but not to the same extent.    Last eye exam [facility], likes one day proclear lens    In past wore contact lenses 6 am to 10 pm, has not worn them lately      Proclear 1 day     HPI    Symptoms:     Decreased vision   Photophobia   Eye discharge                      Ana Ayoub, OD     See Review Of Systems     HPI and ROS performed by Optometrist  scribed by [facility]  Vision Services Supervisor    Medical, surgical and family histories reviewed and updated 10/5/2017.         OBJECTIVE: See Ophthalmology exam    ASSESSMENT:    ICD-10-CM    1. Dry eyes H04.123 propylene glycol (SYSTANE BALANCE) 0.6 % SOLN ophthalmic solution     Eyelid Cleansers (OCUSOFT HYPOCHLOR) LIQD   2. Punctate keratitis of right eye H16.141       PLAN:    Patient Instructions   Patient was advised of today's exam findings.  Use over the counter artificial tears 2 times a day (Soothe XP-Xtra Protection (white liquid), Systane Balance  (white liquid) or Refresh Optive Advanced).  These are oil based.  OCuSOFT HypoChlor 0.02% Eyelid and Eyelash Spray 2 fl. OZ -Available at [facility]  Use twice daily.  Spray cotton swab or cotton pad 3 times, rub gently 3 times along upper and then 3 times along lower lashes and then both lids. Use new applicator for other eye.        Ana Ayoub O.D.  [facility]   [address] [place] [address]  [phone]

## 2017-10-05 NOTE — PATIENT INSTRUCTIONS
Patient was advised of today's exam findings.  Use over the counter artificial tears 2 times a day (Soothe XP-Xtra Protection (white liquid), Systane Balance  (white liquid) or Refresh Optive Advanced).  These are oil based.  OCuSOFT HypoChlor 0.02% Eyelid and Eyelash Spray 2 fl. OZ -Available at [facility]  Use twice daily.  Spray cotton swab or cotton pad 3 times, rub gently 3 times along upper and then 3 times along lower lashes and then both lids. Use new applicator for other eye.        Ana Ayoub O.D.  [facility]   [address] [place] [address] [phone]

## 2017-10-05 NOTE — MR AVS SNAPSHOT
After Visit Summary   10/5/2017    Connor Bowman    MRN: [id number]           Patient Information     Date Of Birth          1980        Visit Information        Provider Department      10/5/2017 2:00 PM Ana Ayoub OD [facility]        Today's Diagnoses     Dry eyes    -  1    Punctate keratitis of right eye          Care Instructions    Patient was advised of today's exam findings.  Use over the counter artificial tears 2 times a day (Soothe XP-Xtra Protection (white liquid), Systane Balance  (white liquid) or Refresh Optive Advanced).  These are oil based.  OCuSOFT HypoChlor 0.02% Eyelid and Eyelash Spray 2 fl. OZ -Available at [facility]  Use twice daily.  Spray cotton swab or cotton pad 3 times, rub gently 3 times along upper and then 3 times along lower lashes and then both lids. Use new applicator for other eye.        Ana Ayoub O.D.  [facility]   [address] [place] [address]  [phone]            Follow-ups after your visit        Who to contact     If you have questions or need follow up information about today's clinic visit or your schedule please contact [facility] directly at [phone].  Normal or non-critical lab and imaging results will be communicated to you by [employer], letter or phone within 4 business days after the clinic has received the results. If you do not hear from us within 7 days, please contact the clinic through [employer] or phone. If you have a critical or abnormal lab result, we will notify you by phone as soon as possible.  Submit refill requests through [employer] or call your pharmacy and they will forward the refill request to us. Please allow 3 business days for your refill to be completed.          Additional Information About Your Visit        [employer] Information     [employer] lets you send messages to your doctor, view your test results, renew your prescriptions, schedule  "appointments and more. To sign up, go to [email or web address] . Click on \"Log in\" on the left side of the screen, which will take you to the Welcome page. Then click on \"Sign up Now\" on the right side of the page.     You will be asked to enter the access code listed below, as well as some personal information. Please follow the directions to create your username and password.     Your access code is: [email or web address]  Expires: 2017  2:31 PM     Your access code will  in 90 days. If you need help or a new code, please call your [place] clinic or [phone].        Care EveryWhere ID     This is your Care EveryWhere ID. This could be used by other organizations to access your [place] medical records  [id number]         Blood Pressure from Last 3 Encounters:   17 (!) 151/101   17 (!) 154/95   17 135/88    Weight from Last 3 Encounters:   17 136.1 kg (300 lb)   17 (!) 141 kg (310 lb 12.8 oz)   16 (!) 145.2 kg (320 lb)              Today, you had the following     No orders found for display         Today's Medication Changes          These changes are accurate as of: 10/5/17  2:45 PM.  If you have any questions, ask your nurse or doctor.               Start taking these medicines.        Dose/Directions    OCUSOFT HYPOCHLOR Liqd   Used for:  Dry eyes   Started by:  Ana Ayoub, OD        Dose:  2 spray   2 sprays 2 times daily OCuSOFT HypoChlor 0.02% Eyelid and Eyelash Spray 2 fl. OZ  Spray cotton applicator 2 times, rub gently 3 times along upper and then 3 times along lower lashes and then rub both lids. Let liquid air dry.  Use new applicator for other eye.   Refills:  0       propylene glycol 0.6 % Soln ophthalmic solution   Commonly known as:  SYSTANE BALANCE   Used for:  Dry eyes   Started by:  Ana Ayoub, OD        Dose:  1 drop   Place 1 drop into both eyes 4 times daily Systane Balance   Refills:  0         Stop taking these medicines if you haven't " already. Please contact your care team if you have questions.     guaiFENesin-codeine 100-10 MG/5ML Soln solution   Commonly known as:  ROBITUSSIN AC   Stopped by:  Ana Ayoub OD                Where to get your medicines      Some of these will need a paper prescription and others can be bought over the counter.  Ask your nurse if you have questions.     You don't need a prescription for these medications     OCUSOFT HYPOCHLOR Liqd    propylene glycol 0.6 % Soln ophthalmic solution                Primary Care Provider Office Phone # Fax #    Dudley Phil Fontana [phone] [address] [place] [address]        Equal Access to Services     [facility]: Hadii aad ku hadasho Soeliali, waaxda luqadaha, qaybta kaalmada adeegyada, tami john . So [facility] [phone].    ATENCIÓN: Si habla español, tiene a winn disposición servicios gratuitos de asistencia lingüística. [facility] [phone].    We comply with applicable federal civil rights laws and [place] laws. We do not discriminate on the basis of race, color, national origin, age, disability, sex, sexual orientation, or gender identity.            Thank you!     Thank you for choosing [facility]  for your care. Our goal is always to provide you with excellent care. Hearing back from our patients is one way we can continue to improve our services. Please take a few minutes to complete the written survey that you may receive in the mail after your visit with us. Thank you!             Your Updated Medication List - Protect others around you: Learn how to safely use, store and throw away your medicines at www.disposemymeds.org.          This list is accurate as of: 10/5/17  2:45 PM.  Always use your most recent med list.                   Brand Name Dispense Instructions for use Diagnosis    aspirin 325 MG tablet      Take 325 mg by mouth        * fluticasone 50 MCG/ACT spray    FLONASE     1 Bottle    Spray 2 sprays into both nostrils daily    Snoring       * fluticasone 50 MCG/ACT spray    FLONASE    1 Bottle    Spray 2 sprays into both nostrils daily    Acute sinusitis with symptoms > 10 days       OCUSOFT HYPOCHLOR Liqd      2 sprays 2 times daily OCuSOFT HypoChlor 0.02% Eyelid and Eyelash Spray 2 fl. OZ  Spray cotton applicator 2 times, rub gently 3 times along upper and then 3 times along lower lashes and then rub both lids. Let liquid air dry.  Use new applicator for other eye.    Dry eyes       propylene glycol 0.6 % Soln ophthalmic solution    SYSTANE BALANCE     Place 1 drop into both eyes 4 times daily Systane Balance    Dry eyes       SUMAtriptan 50 MG tablet    IMITREX    6 tablet    Take 1 tablet (50 mg) by mouth at onset of headache for migraine May repeat dose in 2 hours.  Do not exceed 200 mg in 24 hours    Intractable periodic headache syndrome       * Notice:  This list has 2 medication(s) that are the same as other medications prescribed for you. Read the directions carefully, and ask your doctor or other care provider to review them with you.

## 2017-10-07 PROBLEM — H04.123 DRY EYES: Status: ACTIVE | Noted: 2017-10-07

## 2017-10-07 ASSESSMENT — SLIT LAMP EXAM - LIDS
COMMENTS: MEIBOMIANITIS
COMMENTS: MEIBOMIANITIS

## 2017-10-07 ASSESSMENT — EXTERNAL EXAM - LEFT EYE: OS_EXAM: NORMAL

## 2017-10-07 ASSESSMENT — EXTERNAL EXAM - RIGHT EYE: OD_EXAM: NORMAL

## 2017-10-07 ASSESSMENT — CONF VISUAL FIELD
OS_NORMAL: 1
OD_NORMAL: 1

## 2017-12-10 ENCOUNTER — HOSPITAL ENCOUNTER (EMERGENCY)
Facility: CLINIC | Age: 37
Discharge: HOME | End: 2017-12-10
Attending: EMERGENCY MEDICINE | Admitting: EMERGENCY MEDICINE
Payer: COMMERCIAL

## 2017-12-10 VITALS
OXYGEN SATURATION: 94 % | DIASTOLIC BLOOD PRESSURE: 99 MMHG | SYSTOLIC BLOOD PRESSURE: 151 MMHG | RESPIRATION RATE: 16 BRPM | WEIGHT: 300.05 LBS | TEMPERATURE: 98.6 F | BODY MASS INDEX: 38.52 KG/M2 | HEART RATE: 81 BPM

## 2017-12-10 DIAGNOSIS — S09.90XA CLOSED HEAD INJURY, INITIAL ENCOUNTER: ICD-10-CM

## 2017-12-10 DIAGNOSIS — S01.511A LIP LACERATION, INITIAL ENCOUNTER: ICD-10-CM

## 2017-12-10 PROCEDURE — 90715 TDAP VACCINE 7 YRS/> IM: CPT | Performed by: EMERGENCY MEDICINE

## 2017-12-10 PROCEDURE — 99283 EMERGENCY DEPT VISIT LOW MDM: CPT | Mod: 25 | Performed by: EMERGENCY MEDICINE

## 2017-12-10 PROCEDURE — 90471 IMMUNIZATION ADMIN: CPT | Performed by: EMERGENCY MEDICINE

## 2017-12-10 PROCEDURE — 25000132 ZZH RX MED GY IP 250 OP 250 PS 637: Performed by: EMERGENCY MEDICINE

## 2017-12-10 PROCEDURE — 12011 RPR F/E/E/N/L/M 2.5 CM/<: CPT | Performed by: EMERGENCY MEDICINE

## 2017-12-10 PROCEDURE — 12011 RPR F/E/E/N/L/M 2.5 CM/<: CPT | Mod: Z6 | Performed by: EMERGENCY MEDICINE

## 2017-12-10 PROCEDURE — 25000128 H RX IP 250 OP 636: Performed by: EMERGENCY MEDICINE

## 2017-12-10 RX ORDER — IBUPROFEN 800 MG/1
800 TABLET, FILM COATED ORAL ONCE
Status: COMPLETED | OUTPATIENT
Start: 2017-12-10 | End: 2017-12-10

## 2017-12-10 RX ADMIN — IBUPROFEN 800 MG: 800 TABLET ORAL at 15:58

## 2017-12-10 RX ADMIN — CLOSTRIDIUM TETANI TOXOID ANTIGEN (FORMALDEHYDE INACTIVATED), CORYNEBACTERIUM DIPHTHERIAE TOXOID ANTIGEN (FORMALDEHYDE INACTIVATED), BORDETELLA PERTUSSIS TOXOID ANTIGEN (GLUTARALDEHYDE INACTIVATED), BORDETELLA PERTUSSIS FILAMENTOUS HEMAGGLUTININ ANTIGEN (FORMALDEHYDE INACTIVATED), BORDETELLA PERTUSSIS PERTACTIN ANTIGEN, AND BORDETELLA PERTUSSIS FIMBRIAE 2/3 ANTIGEN 0.5 ML: 5; 2; 2.5; 5; 3; 5 INJECTION, SUSPENSION INTRAMUSCULAR at 15:58

## 2017-12-10 NOTE — ED PROVIDER NOTES
"  History     Chief Complaint   Patient presents with     Lip Laceration     HPI  Connor Bowman is a 37 year old male who presents with a laceration to his lower lip.  Prior to arrival he ran to attend to the [employer], slipped and fell hitting his lip on the edge of a garbage can.  Wife states he also hit his head but the patient is unable tell me where he hit his head.  He did not have LOC but was \"out of it briefly\".  Did not have seizure activity no loss of bowel or bladder..  Currently denies significant headache.  No visual change.  No difficulty with hearing or speech.  Denies malocclusion.  No neck pain no back, chest or abdominal pain.  Did not injure the extremities.  No motor weakness or sensory changes.  His last tetanus was in 2005.  No treatment other than pressure to the lip prior to arrival.        Problem List:    Patient Active Problem List    Diagnosis Date Noted     Dry eyes 10/07/2017     Priority: Medium     Punctate keratitis of right eye 10/05/2017     Priority: Medium     Tear of medial meniscus of right knee, current, unspecified tear type, subsequent encounter 12/05/2016     Priority: Medium     Derangement of posterior horn of medial meniscus 02/15/2012     Priority: Medium     Migraines 01/02/2012     Priority: Medium     Knee pain 01/02/2012     Priority: Medium     CARDIOVASCULAR SCREENING; LDL GOAL LESS THAN 130 10/31/2010     Priority: Medium     Morbid obesity (H) 04/29/2010     Priority: Medium     Hypertension, goal below 140/90      Priority: Medium     Adiposity 02/16/2007     Priority: Medium        Past Medical History:    Past Medical History:   Diagnosis Date     GERD (gastroesophageal reflux disease)      Unspecified essential hypertension        Past Surgical History:    Past Surgical History:   Procedure Laterality Date     GI SURGERY  5/2010    Gastric band     KNEE SURGERY      arthroscopic       Family History:    Family History   Problem Relation Age of Onset     " CANCER Mother      colorectal cancer     HEART DISEASE Maternal Grandmother      HEART DISEASE Maternal Grandfather      HEART DISEASE Brother      bicuspid aortic valve     Hypertension Father      CANCER Paternal Grandmother      uterine CA     Arthritis Paternal Grandfather      RA       Social History:  Marital Status:   [2]  Social History   Substance Use Topics     Smoking status: Never Smoker     Smokeless tobacco: Never Used     Alcohol use No        Medications:      propylene glycol (SYSTANE BALANCE) 0.6 % SOLN ophthalmic solution   Eyelid Cleansers (OCUSOFT HYPOCHLOR) LIQD   fluticasone (FLONASE) 50 MCG/ACT spray   fluticasone (FLONASE) 50 MCG/ACT spray   SUMAtriptan (IMITREX) 50 MG tablet   aspirin 325 MG tablet         Review of Systems all other systems reviewed and are negative.    Physical Exam   BP: (!) 151/99  Pulse: 81  Temp: 98.6  F (37  C)  Resp: 16  Weight: (!) 136.1 kg (300 lb 0.7 oz)  SpO2: 94 %      Physical Exam and alert cooperative male in mild to moderate distress.  HEENT shows no facial swelling or asymmetry.  Pupils are equal and react to light and accommodation.  Extraocular motions are intact.  There is no hemotympanum or salazar signs.  There is no raccoon's eyes.  No epistasis or nasal trauma.  Dentition is intact.  He has a 1.4 cm linear laceration on the lower lip midline.  This does not cross the vermilion border.  No malocclusion.  On palpation scalp there is no localizing tenderness, fluctuance, crepitus or step-off.  Neck is supple.  Back is nontender.  Neurologically nonfocal.  Extremities are atraumatic.    ED Course     ED Course     Procedures         patient had lidocaine with epinephrine instilled for anesthetic.  Total of 2 cc was used.  It was irrigated.  The wound was then closed with 2 4-0 silk sutures in interrupted fashion.  His tetanus is updated.       Critical Care time:  none               Labs Ordered and Resulted from Time of ED Arrival Up to the Time  of Departure from the ED - No data to display  Patient developed mod headache and was given ibuprofen.  No change in his neurologic status.  Assessments & Plan (with Medical Decision Making)   Patient is a 36 yo male presents with an injury to his lower lip.  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  He states he also his head but we cannot find a area of tenderness, fluctuance or crepitus on scalp exam.  He did not lose consciousness and did not have bowel or bladder loss.  No seizure activity.  Patient denies neck, back, chest or abdominal pain.  Did not injure the extremities.  On exam patient was vitally stable.  He is not hypoxic.  There is no hemotympanum or Vogel signs.  No raccoon's eyes.  Pupils were equal and reactive to light and accommodation.  Extraocular muscles are intact.  No nasal trauma.  No dental trauma.  No malocclusion.  Lip laceration described above.  Neck was supple.  Neurologically nonfocal.  Information on head injury and lip laceration provided.  Sutures out in 5-7 days.  Reasons to return for reassessment were discussed  I have reviewed the nursing notes.    I have reviewed the findings, diagnosis, plan and need for follow up with the patient.       New Prescriptions    No medications on file       Final diagnoses:   Lip laceration, initial encounter   Closed head injury, initial encounter       12/10/2017   [facility] EMERGENCY DEPARTMENT     Chandan Fields MD  12/10/17 [id number]

## 2017-12-10 NOTE — ED AVS SNAPSHOT
[facility] Emergency Department    [address] [place] [address]    Phone:  [phone]    Fax:  [phone]                                       Connor Bowman   MRN: [id number]    Department:  [facility] Emergency Department   Date of Visit:  12/10/2017           Patient Information     Date Of Birth          1980        Your diagnoses for this visit were:     Lip laceration, initial encounter     Closed head injury, initial encounter        You were seen by Chandan Fields MD.      Follow-up Information     Follow up with Dudley Fontana MD.    Specialty:  Family Practice    Why:  For suture removal in 5-7 days    Contact information:    [address] [place] [address] [phone]        Discharge References/Attachments     LACERATION, LIP OR MOUTH (ENGLISH)    HEAD INJURY, NO WAKE-UP (ADULT) (ENGLISH)      24 Hour Appointment Hotline       To make an appointment at any [facility] call [phone] ([phone]). If you don't have a family doctor or clinic, we will help you find one. [facility] clinics are conveniently located to serve the needs of you and your family.             Review of your medicines      Our records show that you are taking the medicines listed below. If these are incorrect, please call your family doctor or clinic.        Dose / Directions Last dose taken    aspirin 325 MG tablet   Dose:  325 mg        Take 325 mg by mouth   Refills:  0        * fluticasone 50 MCG/ACT spray   Commonly known as:  FLONASE   Dose:  2 spray   Quantity:  1 Bottle        Spray 2 sprays into both nostrils daily   Refills:  3        * fluticasone 50 MCG/ACT spray   Commonly known as:  FLONASE   Dose:  2 spray   Quantity:  1 Bottle        Spray 2 sprays into both nostrils daily   Refills:  6        OCUSOFT HYPOCHLOR Liqd   Dose:  2 spray        2 sprays 2 times daily OCuSOFT HypoChlor 0.02% Eyelid and Eyelash Spray 2 fl. OZ  Spray cotton applicator 2  times, rub gently 3 times along upper and then 3 times along lower lashes and then rub both lids. Let liquid air dry.  Use new applicator for other eye.   Refills:  0        propylene glycol 0.6 % Soln ophthalmic solution   Commonly known as:  SYSTANE BALANCE   Dose:  1 drop        Place 1 drop into both eyes 4 times daily Systane Balance   Refills:  0        SUMAtriptan 50 MG tablet   Commonly known as:  IMITREX   Dose:  50 mg   Quantity:  6 tablet        Take 1 tablet (50 mg) by mouth at onset of headache for migraine May repeat dose in 2 hours.  Do not exceed 200 mg in 24 hours   Refills:  3        * Notice:  This list has 2 medication(s) that are the same as other medications prescribed for you. Read the directions carefully, and ask your doctor or other care provider to review them with you.            Orders Needing Specimen Collection     None      Pending Results     No orders found from 12/8/2017 to 12/11/2017.            Pending Culture Results     No orders found from 12/8/2017 to 12/11/2017.            Pending Results Instructions     If you had any lab results that were not finalized at the time of your Discharge, you can call the ED Lab Result RN at [phone]. You will be contacted by this team for any positive Lab results or changes in treatment. The nurses are available 7 days a week from 10A to 6:30P.  You can leave a message 24 hours per day and they will return your call.        Thank you for choosing [facility]       Thank you for choosing [facility] for your care. Our goal is always to provide you with excellent care. Hearing back from our patients is one way we can continue to improve our services. Please take a few minutes to complete the written survey that you may receive in the mail after you visit with us. Thank you!        [employer] Information     [employer] lets you send messages to your doctor, view your test results, renew your prescriptions, schedule appointments and more. To sign up, go  "to [email or web address] . Click on \"Log in\" on the left side of the screen, which will take you to the Welcome page. Then click on \"Sign up Now\" on the right side of the page.     You will be asked to enter the access code listed below, as well as some personal information. Please follow the directions to create your username and password.     Your access code is: [email or web address]  Expires: 2017  1:31 PM     Your access code will  in 90 days. If you need help or a new code, please call your [facility] clinic or [phone].        Care EveryWhere ID     This is your Care EveryWhere ID. This could be used by other organizations to access your [facility] medical records  [id number]        Equal Access to Services     SUMA TELLO : Aleisha Jane, thee vang, mauricio avila, tami cortez. So [facility] [phone].    ATENCIÓN: Si habla español, tiene a winn disposición servicios gratuitos de asistencia lingüística. Sheela al [phone].    We comply with applicable federal civil rights laws and [place] laws. We do not discriminate on the basis of race, color, national origin, age, disability, sex, sexual orientation, or gender identity.            After Visit Summary       This is your record. Keep this with you and show to your community pharmacist(s) and doctor(s) at your next visit.                  "

## 2017-12-10 NOTE — ED AVS SNAPSHOT
[facility] Emergency Department    [address] [place] [address]    Phone:  [phone]    Fax:  [phone]                                       Connor Bowman   MRN: [id number]    Department:  [facility] Emergency Department   Date of Visit:  12/10/2017           After Visit Summary Signature Page     I have received my discharge instructions, and my questions have been answered. I have discussed any challenges I see with this plan with the nurse or doctor.    ..........................................................................................................................................  Patient/Patient Representative Signature      ..........................................................................................................................................  Patient Representative Print Name and Relationship to Patient    ..................................................               ................................................  Date                                            Time    ..........................................................................................................................................  Reviewed by Signature/Title    ...................................................              ..............................................  Date                                                            Time

## 2018-02-19 ENCOUNTER — HOSPITAL ENCOUNTER (EMERGENCY)
Facility: CLINIC | Age: 38
Discharge: HOME | End: 2018-02-19
Attending: FAMILY MEDICINE | Admitting: FAMILY MEDICINE
Payer: COMMERCIAL

## 2018-02-19 VITALS
SYSTOLIC BLOOD PRESSURE: 171 MMHG | DIASTOLIC BLOOD PRESSURE: 106 MMHG | BODY MASS INDEX: 41.09 KG/M2 | TEMPERATURE: 99 F | WEIGHT: 315 LBS | RESPIRATION RATE: 19 BRPM | OXYGEN SATURATION: 94 %

## 2018-02-19 DIAGNOSIS — R03.0 ELEVATED BLOOD PRESSURE READING WITHOUT DIAGNOSIS OF HYPERTENSION: ICD-10-CM

## 2018-02-19 DIAGNOSIS — B34.9 VIRAL SYNDROME: ICD-10-CM

## 2018-02-19 DIAGNOSIS — J02.9 PHARYNGITIS, UNSPECIFIED ETIOLOGY: ICD-10-CM

## 2018-02-19 LAB
DEPRECATED S PYO AG THROAT QL EIA: NORMAL
SPECIMEN SOURCE: NORMAL

## 2018-02-19 PROCEDURE — 99283 EMERGENCY DEPT VISIT LOW MDM: CPT | Performed by: FAMILY MEDICINE

## 2018-02-19 PROCEDURE — 87081 CULTURE SCREEN ONLY: CPT | Performed by: FAMILY MEDICINE

## 2018-02-19 PROCEDURE — 87880 STREP A ASSAY W/OPTIC: CPT | Performed by: FAMILY MEDICINE

## 2018-02-19 PROCEDURE — 99282 EMERGENCY DEPT VISIT SF MDM: CPT | Mod: Z6 | Performed by: FAMILY MEDICINE

## 2018-02-19 RX ORDER — ACETAMINOPHEN 500 MG
1000 TABLET ORAL EVERY 6 HOURS PRN
Qty: 100 TABLET | Refills: 11 | COMMUNITY
Start: 2018-02-19 | End: 2018-10-03

## 2018-02-19 RX ORDER — IBUPROFEN 200 MG
600 TABLET ORAL EVERY 6 HOURS PRN
COMMUNITY
Start: 2018-02-19 | End: 2018-10-03

## 2018-02-19 NOTE — ED AVS SNAPSHOT
[facility] Emergency Department    [address] [place] [address]    Phone:  [phone]    Fax:  [phone]                                       Connor Bowman   MRN: [id number]    Department:  [facility] Emergency Department   Date of Visit:  2/19/2018           Patient Information     Date Of Birth          1980        Your diagnoses for this visit were:     Pharyngitis, unspecified etiology suspect viral etiology    Viral syndrome     Elevated blood pressure reading without diagnosis of hypertension        You were seen by Jeyson Cowart MD.      Follow-up Information     Follow up with Dudley Fontana MD.    Specialty:  Family Practice    Contact information:    [address] [place] [address] [phone]          Discharge Instructions       Encourage fluids.  Tylenol/ibuprofen as needed for fever/pain.  Popsicles, ice chips, ice cream, over-the-counter throat lozenges/sprays, etc. can all be helpful.  Recheck in clinic if persistent problems.  You should also have your blood pressure rechecked as it was elevated tonight in the ED.    Dr. Fontana can do this in the clinic when you are feeling better.  Return to the ED if worse/concerns.  It was nice visiting with both of you this evening.  I hope you feel better soon.    Thank you for choosing [facility]. We appreciate the opportunity to meet your urgent medical needs. Please let us know if we could have done anything to make your stay more satisfying.    After discharge, please closely monitor for any new or worsening symptoms. Return to the Emergency Department if you develop any acute worsening signs or symptoms.    If you had lab work, cultures or imaging studies done during your stay, the final results may still be pending. We will call you if your plan of care needs to change. However, if you are not improving as expected, please follow up with your primary care provider or  clinic.     Start any prescription medications that were prescribed to you and take them as directed.     Please see additional handouts that may be pertinent to your condition.        When You Have a Sore Throat    A sore throat can be painful. There are many reasons why you may have a sore throat. Your healthcare provider will work with you to find the cause of your sore throat. He or she will also find the best treatment for you.  What causes a sore throat?  Sore throats can be caused or worsened by:    Cold or flu viruses    Bacteria    Irritants such as tobacco smoke or air pollution    Acid reflux  A healthy throat  The tonsils are on the sides of the throat near the base of the tongue. They collect viruses and bacteria and help fight infection. The throat (pharynx) is the passage for air. Mucus from the nasal cavity also moves down the passage.  An inflamed throat  The tonsils and pharynx can become inflamed due to a cold or flu virus. Postnasal drip (excess mucus draining from the nasal cavity) can irritate the throat. It can also make the throat or tonsils more likely to be infected by bacteria. Severe, untreated tonsillitis in children or adults can cause a pocket of pus (abscess) to form near the tonsil.  Your evaluation  A medical evaluation can help find the cause of your sore throat. It can also help your healthcare provider choose the best treatment for you. The evaluation may include a health history, physical exam, and diagnostic tests.  Health history  Your healthcare provider may ask you the following:    How long has the sore throat lasted and how have you been treating it?    Do you have any other symptoms, such as body aches, fever, or cough?    Does your sore throat recur? If so, how often? How many days of school or work have you missed because of a sore throat?    Do you have trouble eating or swallowing?    Have you been told that you snore or have other sleep problems?    Do you have bad  "breath?    Do you cough up bad-tasting mucus?  Physical exam  During the exam, your healthcare provider checks your ears, nose, and throat for problems. He or she also checks for swelling in the neck, and may listen to your chest.  Possible tests  Other tests your healthcare provider may perform include:    A throat swab to check for bacteria such as streptococcus (the bacteria that causes strep throat)    A blood test to check for mononucleosis (a viral infection)    A chest X-ray to rule out pneumonia, especially if you have a cough  Treating a sore throat  Treatment depends on many factors. What is the likely cause? Is the problem recent? Does it keep coming back? In many cases, the best thing to do is to treat the symptoms, rest, and let the problem heal itself. Antibiotics may help clear up some bacterial infections. For cases of severe or recurring tonsillitis, the tonsils may need to be removed.  Relieving your symptoms    Don t smoke, and avoid secondhand smoke.    For children, try throat sprays or Popsicles. Adults and older children may try lozenges.    Drink warm liquids to soothe the throat and help thin mucus. Avoid alcohol, spicy foods, and acidic drinks such as orange juice. These can irritate the throat.    Gargle with warm saltwater (1 teaspoon of salt to 8 ounces of warm water).    Use a humidifier to keep air moist and relieve throat dryness.    Try over-the-counter pain relievers such as acetaminophen or ibuprofen. Use as directed, and don t exceed the recommended dose. Don t give aspirin to children.   Are antibiotics needed?  If your sore throat is due to a bacterial infection, antibiotics may speed healing and prevent complications. Although group A streptococcus (\"strep throat\" or GAS) is the major treatable infection for a sore throat, GAS causes only 5% to 15% of sore throats in adults who seek medical care. Most sore throats are caused by cold or flu viruses. And antibiotics don t treat " viral illness. In fact, using antibiotics when they re not needed may produce bacteria that are harder to kill. Your healthcare provider will prescribe antibiotics only if he or she thinks they are likely to help.  If antibiotics are prescribed  Take the medicine exactly as directed. Be sure to finish your prescription even if you re feeling better. And be sure to ask your healthcare provider or pharmacist what side effects are common and what to do about them.  Is surgery needed?  In some cases, tonsils need to be removed. This is often done as outpatient (same-day) surgery. Your healthcare provider may advise removing the tonsils in cases of:    Several severe bouts of tonsillitis in a year.  Severe  episodes include those that lead to missed days of school or work, or that need to be treated with antibiotics.    Tonsillitis that causes breathing problems during sleep    Tonsillitis caused by food particles collecting in pouches in the tonsils (cryptic tonsillitis)  Call your healthcare provider if any of the following occur:    Symptoms worsen, or new symptoms develop.    Swollen tonsils make breathing difficult.    The pain is severe enough to keep you from drinking liquids.    A skin rash, hives, or wheezing develops. Any of these could signal an allergic reaction to antibiotics.    Symptoms don t improve within a week.    Symptoms don t improve within 2 to 3 days of starting antibiotics.   Date Last Reviewed: 10/1/2016    5862-3950 [employer]. [address] [place] [address], [place] [address]. All rights reserved. This information is not intended as a substitute for professional medical care. Always follow your healthcare professional's instructions.              Discharge References/Attachments     PHARYNGITIS, VIRAL (ENGLISH)      24 Hour Appointment Hotline       To make an appointment at any [facility], call [phone] ([phone]). If you don't have a family doctor or clinic, we will  help you find one. [facility] are conveniently located to serve the needs of you and your family.             Review of your medicines      START taking        Dose / Directions Last dose taken    acetaminophen 500 MG tablet   Commonly known as:  TYLENOL   Dose:  1000 mg   Quantity:  100 tablet        Take 2 tablets (1,000 mg) by mouth every 6 hours as needed for pain or fever   Refills:  11        ibuprofen 200 MG tablet   Commonly known as:  ADVIL/MOTRIN   Dose:  600 mg        Take 3 tablets (600 mg) by mouth every 6 hours as needed for pain or fever   Refills:  0          Our records show that you are taking the medicines listed below. If these are incorrect, please call your family doctor or clinic.        Dose / Directions Last dose taken    aspirin 325 MG tablet   Dose:  325 mg        Take 325 mg by mouth   Refills:  0        * fluticasone 50 MCG/ACT spray   Commonly known as:  FLONASE   Dose:  2 spray   Quantity:  1 Bottle        Spray 2 sprays into both nostrils daily   Refills:  3        * fluticasone 50 MCG/ACT spray   Commonly known as:  FLONASE   Dose:  2 spray   Quantity:  1 Bottle        Spray 2 sprays into both nostrils daily   Refills:  6        OCUSOFT HYPOCHLOR Liqd   Dose:  2 spray        2 sprays 2 times daily OCuSOFT HypoChlor 0.02% Eyelid and Eyelash Spray 2 fl. OZ  Spray cotton applicator 2 times, rub gently 3 times along upper and then 3 times along lower lashes and then rub both lids. Let liquid air dry.  Use new applicator for other eye.   Refills:  0        propylene glycol 0.6 % Soln ophthalmic solution   Commonly known as:  SYSTANE BALANCE   Dose:  1 drop        Place 1 drop into both eyes 4 times daily Systane Balance   Refills:  0        SUMAtriptan 50 MG tablet   Commonly known as:  IMITREX   Dose:  50 mg   Quantity:  6 tablet        Take 1 tablet (50 mg) by mouth at onset of headache for migraine May repeat dose in 2 hours.  Do not exceed 200 mg in 24 hours   Refills:  3         * Notice:  This list has 2 medication(s) that are the same as other medications prescribed for you. Read the directions carefully, and ask your doctor or other care provider to review them with you.            Prescriptions were sent or printed at these locations (2 Prescriptions)                   [facility]   [address] [address]    Telephone:  [phone]   Fax:  [phone]   Hours:                  Not Printed or Sent (2 of 2)         ibuprofen (ADVIL/MOTRIN) 200 MG tablet               acetaminophen (TYLENOL) 500 MG tablet                Procedures and tests performed during your visit     Beta strep group A culture    Rapid strep screen      Orders Needing Specimen Collection     None      Pending Results     Date and Time Order Name Status Description    2/19/2018 2239 Beta strep group A culture In process             Pending Culture Results     Date and Time Order Name Status Description    2/19/2018 2239 Beta strep group A culture In process             Pending Results Instructions     If you had any lab results that were not finalized at the time of your Discharge, you can call the ED Lab Result RN at [phone]. You will be contacted by this team for any positive Lab results or changes in treatment. The nurses are available 7 days a week from 10A to 6:30P.  You can leave a message 24 hours per day and they will return your call.        Thank you for choosing [facility]       Thank you for choosing [facility] for your care. Our goal is always to provide you with excellent care. Hearing back from our patients is one way we can continue to improve our services. Please take a few minutes to complete the written survey that you may receive in the mail after you visit with us. Thank you!        [employer] Information     [employer] lets you send messages to your doctor, view your test results, renew your prescriptions, schedule appointments and more. To sign up, go  "to [email or web address] . Click on \"Log in\" on the left side of the screen, which will take you to the Welcome page. Then click on \"Sign up Now\" on the right side of the page.     You will be asked to enter the access code listed below, as well as some personal information. Please follow the directions to create your username and password.     Your access code is: [email or web address]  Expires: 2018 11:23 PM     Your access code will  in 90 days. If you need help or a new code, please call your [place] clinic or [phone].        Care EveryWhere ID     This is your Care EveryWhere ID. This could be used by other organizations to access your [facility] medical records  [id number]        Equal Access to Services     SUMA TELLO : Aleisha Jane, thee vang, mauricio avila, tami cortez. So [facility] [phone].    ATENCIÓN: Si habla español, tiene a winn disposición servicios gratuitos de asistencia lingüística. Sheela al [phone].    We comply with applicable federal civil rights laws and [place] laws. We do not discriminate on the basis of race, color, national origin, age, disability, sex, sexual orientation, or gender identity.            After Visit Summary       This is your record. Keep this with you and show to your community pharmacist(s) and doctor(s) at your next visit.                  "

## 2018-02-19 NOTE — ED AVS SNAPSHOT
[facility] Emergency Department    [address] [place] [address]    Phone:  [phone]    Fax:  [phone]                                       Connor Bowman   MRN: [id number]    Department:  [facility] Emergency Department   Date of Visit:  2/19/2018           After Visit Summary Signature Page     I have received my discharge instructions, and my questions have been answered. I have discussed any challenges I see with this plan with the nurse or doctor.    ..........................................................................................................................................  Patient/Patient Representative Signature      ..........................................................................................................................................  Patient Representative Print Name and Relationship to Patient    ..................................................               ................................................  Date                                            Time    ..........................................................................................................................................  Reviewed by Signature/Title    ...................................................              ..............................................  Date                                                            Time

## 2018-02-20 NOTE — ED PROVIDER NOTES
History     Chief Complaint   Patient presents with     Pharyngitis     HPI  Connor Bowman is a 37 year old male who presents to the ED with a sore throat and fever.  Started with a fever Friday night, 3 days ago.  Sore throat started yesterday on Sunday.  It is bilateral.  Does not localize.  Temps up to 102.5.  Slight cough.  No nasal congestion.  Had a headache yesterday but none today.  Has been using Tylenol and ibuprofen.  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  Here with his son, Gigi, who has not been ill.      Problem List:    Patient Active Problem List    Diagnosis Date Noted     Dry eyes 10/07/2017     Priority: Medium     Punctate keratitis of right eye 10/05/2017     Priority: Medium     Tear of medial meniscus of right knee, current, unspecified tear type, subsequent encounter 12/05/2016     Priority: Medium     Derangement of posterior horn of medial meniscus 02/15/2012     Priority: Medium     Migraines 01/02/2012     Priority: Medium     Knee pain 01/02/2012     Priority: Medium     CARDIOVASCULAR SCREENING; LDL GOAL LESS THAN 130 10/31/2010     Priority: Medium     Morbid obesity (H) 04/29/2010     Priority: Medium     Hypertension, goal below 140/90      Priority: Medium     Adiposity 02/16/2007     Priority: Medium        Past Medical History:    Past Medical History:   Diagnosis Date     GERD (gastroesophageal reflux disease)      Unspecified essential hypertension        Past Surgical History:    Past Surgical History:   Procedure Laterality Date     GI SURGERY  5/2010    Gastric band     KNEE SURGERY      arthroscopic       Family History:    Family History   Problem Relation Age of Onset     CANCER Mother      colorectal cancer     HEART DISEASE Maternal Grandmother      HEART DISEASE Maternal Grandfather      HEART DISEASE Brother      bicuspid aortic valve     Hypertension Father      CANCER Paternal Grandmother       uterine CA     Arthritis Paternal Grandfather      RA       Social History:  Marital Status:   [2]  Social History   Substance Use Topics     Smoking status: Never Smoker     Smokeless tobacco: Never Used     Alcohol use No        Medications:      ibuprofen (ADVIL/MOTRIN) 200 MG tablet   acetaminophen (TYLENOL) 500 MG tablet   propylene glycol (SYSTANE BALANCE) 0.6 % SOLN ophthalmic solution   Eyelid Cleansers (OCUSOFT HYPOCHLOR) LIQD   fluticasone (FLONASE) 50 MCG/ACT spray   fluticasone (FLONASE) 50 MCG/ACT spray   SUMAtriptan (IMITREX) 50 MG tablet   aspirin 325 MG tablet         Review of Systems   All other systems reviewed and are negative.      Physical Exam   BP: (!) 189/116  Heart Rate: 85  Temp: 99  F (37.2  C)  Resp: 19  Weight: (!) 145.2 kg (320 lb)  SpO2: 94 %      Physical Exam   Constitutional: He is oriented to person, place, and time. He appears well-developed and well-nourished. He appears distressed (mild).   HENT:   Posterior pharynx is moderately injected with several small whitish ulcers or exudate on the soft palate and tonsils right greater than left.  No asymmetry.  No sign of abscess.  No trismus     Neck: Neck supple.   Cardiovascular: Normal rate and regular rhythm.    Pulmonary/Chest: Effort normal and breath sounds normal. No respiratory distress.   Neurological: He is alert and oriented to person, place, and time.   Skin: Skin is warm and dry.       ED Course    Rapid strep was negative.  We discussed sore throats and the likely viral etiology of this illness.  He wondered about mono and that is certainly possible but he has only been sick for 3 days so I would not even check a Monospot now as it is likely too early to turn positive.  If his symptoms persist longer than a week, we could reconsider but again, treatment would be symptomatic.      His blood pressure is up a bit tonight.  I do not see that he has a history of hypertension and asked him to recheck his blood pressure  in clinic when he is feeling better.  We will contact him with the throat culture comes back positive.    Discussed symptomatic treatment.  He is comfortable with this plan         ED Course     Procedures       Results for orders placed or performed during the hospital encounter of 02/19/18 (from the past 24 hour(s))   Rapid strep screen   Result Value Ref Range    Specimen Description Throat     Rapid Strep A Screen       NEGATIVE: No Group A streptococcal antigen detected by immunoassay, await culture report.           Assessments & Plan (with Medical Decision Making)      (J02.9) Pharyngitis, unspecified etiology  Comment: suspect viral etiology     (B34.9) Viral syndrome    (R03.0) Elevated blood pressure reading without diagnosis of hypertension  Comment:   Plan: Verbal and written discharge instructions given.  See discussion above.             I have reviewed the nursing notes.    I have reviewed the findings, diagnosis, plan and need for follow up with the patient.       New Prescriptions    ACETAMINOPHEN (TYLENOL) 500 MG TABLET    Take 2 tablets (1,000 mg) by mouth every 6 hours as needed for pain or fever    IBUPROFEN (ADVIL/MOTRIN) 200 MG TABLET    Take 3 tablets (600 mg) by mouth every 6 hours as needed for pain or fever       Final diagnoses:   Pharyngitis, unspecified etiology - suspect viral etiology   Viral syndrome   Elevated blood pressure reading without diagnosis of hypertension       2/19/2018   [facility] EMERGENCY DEPARTMENT     Jeyson Cowart MD  02/19/18 3014

## 2018-02-20 NOTE — ED NOTES
Pt presents with concerns of a sore throat .  Friday night had a fever 102 and felt miserable.  Pt has been alternating Tylenol and Ibuprofen.  Pt states that yesterday his throat started to hurt and today states that it is difficult to swallow.  Tylenol last at 2200 and Ibuprofen 1800.

## 2018-02-20 NOTE — DISCHARGE INSTRUCTIONS
Encourage fluids.  Tylenol/ibuprofen as needed for fever/pain.  Popsicles, ice chips, ice cream, over-the-counter throat lozenges/sprays, etc. can all be helpful.  Recheck in clinic if persistent problems.  You should also have your blood pressure rechecked as it was elevated tonight in the ED.    Dr. Fontana can do this in the clinic when you are feeling better.  Return to the ED if worse/concerns.  It was nice visiting with both of you this evening.  I hope you feel better soon.    Thank you for choosing [facility]. We appreciate the opportunity to meet your urgent medical needs. Please let us know if we could have done anything to make your stay more satisfying.    After discharge, please closely monitor for any new or worsening symptoms. Return to the Emergency Department if you develop any acute worsening signs or symptoms.    If you had lab work, cultures or imaging studies done during your stay, the final results may still be pending. We will call you if your plan of care needs to change. However, if you are not improving as expected, please follow up with your primary care provider or clinic.     Start any prescription medications that were prescribed to you and take them as directed.     Please see additional handouts that may be pertinent to your condition.        When You Have a Sore Throat    A sore throat can be painful. There are many reasons why you may have a sore throat. Your healthcare provider will work with you to find the cause of your sore throat. He or she will also find the best treatment for you.  What causes a sore throat?  Sore throats can be caused or worsened by:    Cold or flu viruses    Bacteria    Irritants such as tobacco smoke or air pollution    Acid reflux  A healthy throat  The tonsils are on the sides of the throat near the base of the tongue. They collect viruses and bacteria and help fight infection. The throat (pharynx) is the passage for air. Mucus from the  nasal cavity also moves down the passage.  An inflamed throat  The tonsils and pharynx can become inflamed due to a cold or flu virus. Postnasal drip (excess mucus draining from the nasal cavity) can irritate the throat. It can also make the throat or tonsils more likely to be infected by bacteria. Severe, untreated tonsillitis in children or adults can cause a pocket of pus (abscess) to form near the tonsil.  Your evaluation  A medical evaluation can help find the cause of your sore throat. It can also help your healthcare provider choose the best treatment for you. The evaluation may include a health history, physical exam, and diagnostic tests.  Health history  Your healthcare provider may ask you the following:    How long has the sore throat lasted and how have you been treating it?    Do you have any other symptoms, such as body aches, fever, or cough?    Does your sore throat recur? If so, how often? How many days of school or work have you missed because of a sore throat?    Do you have trouble eating or swallowing?    Have you been told that you snore or have other sleep problems?    Do you have bad breath?    Do you cough up bad-tasting mucus?  Physical exam  During the exam, your healthcare provider checks your ears, nose, and throat for problems. He or she also checks for swelling in the neck, and may listen to your chest.  Possible tests  Other tests your healthcare provider may perform include:    A throat swab to check for bacteria such as streptococcus (the bacteria that causes strep throat)    A blood test to check for mononucleosis (a viral infection)    A chest X-ray to rule out pneumonia, especially if you have a cough  Treating a sore throat  Treatment depends on many factors. What is the likely cause? Is the problem recent? Does it keep coming back? In many cases, the best thing to do is to treat the symptoms, rest, and let the problem heal itself. Antibiotics may help clear up some bacterial  "infections. For cases of severe or recurring tonsillitis, the tonsils may need to be removed.  Relieving your symptoms    Don t smoke, and avoid secondhand smoke.    For children, try throat sprays or Popsicles. Adults and older children may try lozenges.    Drink warm liquids to soothe the throat and help thin mucus. Avoid alcohol, spicy foods, and acidic drinks such as orange juice. These can irritate the throat.    Gargle with warm saltwater (1 teaspoon of salt to 8 ounces of warm water).    Use a humidifier to keep air moist and relieve throat dryness.    Try over-the-counter pain relievers such as acetaminophen or ibuprofen. Use as directed, and don t exceed the recommended dose. Don t give aspirin to children.   Are antibiotics needed?  If your sore throat is due to a bacterial infection, antibiotics may speed healing and prevent complications. Although group A streptococcus (\"strep throat\" or GAS) is the major treatable infection for a sore throat, GAS causes only 5% to 15% of sore throats in adults who seek medical care. Most sore throats are caused by cold or flu viruses. And antibiotics don t treat viral illness. In fact, using antibiotics when they re not needed may produce bacteria that are harder to kill. Your healthcare provider will prescribe antibiotics only if he or she thinks they are likely to help.  If antibiotics are prescribed  Take the medicine exactly as directed. Be sure to finish your prescription even if you re feeling better. And be sure to ask your healthcare provider or pharmacist what side effects are common and what to do about them.  Is surgery needed?  In some cases, tonsils need to be removed. This is often done as outpatient (same-day) surgery. Your healthcare provider may advise removing the tonsils in cases of:    Several severe bouts of tonsillitis in a year.  Severe  episodes include those that lead to missed days of school or work, or that need to be treated with " antibiotics.    Tonsillitis that causes breathing problems during sleep    Tonsillitis caused by food particles collecting in pouches in the tonsils (cryptic tonsillitis)  Call your healthcare provider if any of the following occur:    Symptoms worsen, or new symptoms develop.    Swollen tonsils make breathing difficult.    The pain is severe enough to keep you from drinking liquids.    A skin rash, hives, or wheezing develops. Any of these could signal an allergic reaction to antibiotics.    Symptoms don t improve within a week.    Symptoms don t improve within 2 to 3 days of starting antibiotics.   Date Last Reviewed: 10/1/2016    8200-8605 [employer]. [address] [place] [address], [place] [address]. All rights reserved. This information is not intended as a substitute for professional medical care. Always follow your healthcare professional's instructions.

## 2018-02-21 DIAGNOSIS — G43.C1 INTRACTABLE PERIODIC HEADACHE SYNDROME: ICD-10-CM

## 2018-02-22 ENCOUNTER — APPOINTMENT (OUTPATIENT)
Dept: GENERAL RADIOLOGY | Facility: CLINIC | Age: 38
End: 2018-02-22
Attending: EMERGENCY MEDICINE
Payer: COMMERCIAL

## 2018-02-22 ENCOUNTER — HOSPITAL ENCOUNTER (EMERGENCY)
Facility: CLINIC | Age: 38
Discharge: HOME | End: 2018-02-22
Attending: EMERGENCY MEDICINE | Admitting: EMERGENCY MEDICINE
Payer: COMMERCIAL

## 2018-02-22 VITALS
WEIGHT: 315 LBS | DIASTOLIC BLOOD PRESSURE: 96 MMHG | OXYGEN SATURATION: 100 % | BODY MASS INDEX: 41.09 KG/M2 | TEMPERATURE: 98.1 F | SYSTOLIC BLOOD PRESSURE: 157 MMHG | RESPIRATION RATE: 18 BRPM | HEART RATE: 76 BPM

## 2018-02-22 DIAGNOSIS — J11.1 INFLUENZA-LIKE ILLNESS: ICD-10-CM

## 2018-02-22 DIAGNOSIS — J01.01 ACUTE RECURRENT MAXILLARY SINUSITIS: ICD-10-CM

## 2018-02-22 LAB
BACTERIA SPEC CULT: NORMAL
SPECIMEN SOURCE: NORMAL

## 2018-02-22 PROCEDURE — 70220 X-RAY EXAM OF SINUSES: CPT | Mod: TC

## 2018-02-22 PROCEDURE — 99284 EMERGENCY DEPT VISIT MOD MDM: CPT | Mod: Z6 | Performed by: EMERGENCY MEDICINE

## 2018-02-22 PROCEDURE — 25000128 H RX IP 250 OP 636: Performed by: EMERGENCY MEDICINE

## 2018-02-22 PROCEDURE — 99283 EMERGENCY DEPT VISIT LOW MDM: CPT | Performed by: EMERGENCY MEDICINE

## 2018-02-22 RX ORDER — KETOROLAC TROMETHAMINE 30 MG/ML
60 INJECTION, SOLUTION INTRAMUSCULAR; INTRAVENOUS ONCE
Status: COMPLETED | OUTPATIENT
Start: 2018-02-22 | End: 2018-02-22

## 2018-02-22 RX ORDER — SUMATRIPTAN 50 MG/1
TABLET, FILM COATED ORAL
Qty: 6 TABLET | Refills: 3 | Status: SHIPPED | OUTPATIENT
Start: 2018-02-22 | End: 2018-07-15

## 2018-02-22 RX ADMIN — KETOROLAC TROMETHAMINE 60 MG: 30 INJECTION, SOLUTION INTRAMUSCULAR at 14:03

## 2018-02-22 NOTE — ED PROVIDER NOTES
History     Chief Complaint   Patient presents with     Pharyngitis     HPI  Connor Bowman is a 37 year old male who presents with 6 day history of upper respiratory symptoms including congestion, sore throat, nonproductive cough, body aches, and fever to 102.5.  Patient is a  is exposed to strep, influenza, and mono.  Patient was seen 3 days ago in the ER and had a negative strep screen.  Culture showed no B hemolytic strep.  Patient currently denies headache.  No difficulty speech or swallowing.  Denies chest pain or shortness of breath.  Denies abdominal pain, nausea vomiting.  No diarrhea or dysuria.  No leg pain or swelling.  No personal or family history of DVT or PE.  Patient does have history of hypertension.    Problem List:    Patient Active Problem List    Diagnosis Date Noted     Dry eyes 10/07/2017     Priority: Medium     Punctate keratitis of right eye 10/05/2017     Priority: Medium     Tear of medial meniscus of right knee, current, unspecified tear type, subsequent encounter 12/05/2016     Priority: Medium     Derangement of posterior horn of medial meniscus 02/15/2012     Priority: Medium     Migraines 01/02/2012     Priority: Medium     Knee pain 01/02/2012     Priority: Medium     CARDIOVASCULAR SCREENING; LDL GOAL LESS THAN 130 10/31/2010     Priority: Medium     Morbid obesity (H) 04/29/2010     Priority: Medium     Hypertension, goal below 140/90      Priority: Medium     Adiposity 02/16/2007     Priority: Medium        Past Medical History:    Past Medical History:   Diagnosis Date     GERD (gastroesophageal reflux disease)      Unspecified essential hypertension        Past Surgical History:    Past Surgical History:   Procedure Laterality Date     GI SURGERY  5/2010    Gastric band     KNEE SURGERY      arthroscopic       Family History:    Family History   Problem Relation Age of Onset     CANCER Mother      colorectal cancer     HEART DISEASE Maternal Grandmother       HEART DISEASE Maternal Grandfather      HEART DISEASE Brother      bicuspid aortic valve     Hypertension Father      CANCER Paternal Grandmother      uterine CA     Arthritis Paternal Grandfather      RA       Social History:  Marital Status:   [2]  Social History   Substance Use Topics     Smoking status: Never Smoker     Smokeless tobacco: Never Used     Alcohol use No        Medications:      amoxicillin-clavulanate (AUGMENTIN) 875-125 MG per tablet   ibuprofen (ADVIL/MOTRIN) 200 MG tablet   acetaminophen (TYLENOL) 500 MG tablet   propylene glycol (SYSTANE BALANCE) 0.6 % SOLN ophthalmic solution   Eyelid Cleansers (OCUSOFT HYPOCHLOR) LIQD   fluticasone (FLONASE) 50 MCG/ACT spray   fluticasone (FLONASE) 50 MCG/ACT spray   SUMAtriptan (IMITREX) 50 MG tablet   aspirin 325 MG tablet         Review of Systems all other systems reviewed and are negative.    Physical Exam   BP: (!) 186/119  Pulse: 89  Temp: 98.1  F (36.7  C)  Resp: 20  Weight: (!) 145.2 kg (320 lb)  SpO2: 97 %      Physical Exam general alert operative male in moderate distress.  HEENT reveals ears to be clear bilaterally.  Eyes show no injection or mattering.  Nasal passages swollen to occlusion.  He has tenderness over the maxillary sinuses with left being greater than right.  Orally the soft palate and tonsillar bed is erythematous but no enlargement or exudate is noted on the tonsils.  Neck is supple without meningismus.  He has shotty anterior nodes but no posterior nodes.  No stridor.  Lungs are clear.  Cardiac regular rate without murmur.  Abdomen is obese but benign.  Extremities reveal no edema, calf or thigh tenderness.    ED Course     ED Course     Procedures           Results for orders placed or performed during the hospital encounter of 02/22/18   XR Sinus Complete G/E 3 Views    Narrative    SINUS COMPLETE THREE OR MORE VIEWS   2/22/2018 1:48 PM     HISTORY:  Facial pain with colored discharge.     COMPARISON: None.       Impression    IMPRESSION:   1. There is near opacification of the left maxillary sinus which could  be due to fluid or soft tissue and is most consistent with sinusitis.  The remaining visualized paranasal sinuses are grossly clear.   2. No fracture is identified.   3. No radiopaque foreign bodies are projected over the orbits to  suggest metal within the orbits.            Critical Care time:  none               Labs Ordered and Resulted from Time of ED Arrival Up to the Time of Departure from the ED - No data to display    Assessments & Plan (with Medical Decision Making)   Patient is a 37-year-old male presents with 6 day history of upper respiratory symptoms including congestion, sore throat, nonproductive cough, body aches, and fever to 102.5.  Patient has had intermittent headaches for the last 3 weeks.  Does have a history of migraines.  He has had some brown or colored nasal drainage.  No GI complaints such as vomiting, diarrhea, or abdominal pain.  No urinary symptoms.  He was noted to be hypertensive on arrival but does have a history of hypertension.  This improved with improvement in headache with IM Toradol.  Patient was afebrile here and not hypoxic.  He had nasal congestion and swelling near occlusion.  Tenderness over the maxillary sinuses.  Sinus x-ray shows complete opacification of the left maxillary sinus.  Orally he had erythema described above.  Rapid strep was negative.  Cultures negative.  No meningismus.  Normal respiratory and cardiac auscultation.  An obese but benign abdomen.  No swelling of the lower extremities.  Patient is given Augmentin for sinusitis.  Information sinusitis is provided.  I do believe he does have an influenza-like illness but is too far into the course to benefit from Tamiflu.  Reasons to return for reassessment discussed.  I have reviewed the nursing notes.    I have reviewed the findings, diagnosis, plan and need for follow up with the patient.       Discharge  Medication List as of 2/22/2018  2:14 PM      START taking these medications    Details   amoxicillin-clavulanate (AUGMENTIN) 875-125 MG per tablet Take 1 tablet by mouth 2 times daily for 10 days, Disp-20 tablet, R-0, E-Prescribe             Final diagnoses:   Influenza-like illness   Acute recurrent maxillary sinusitis       2/22/2018   [facility] EMERGENCY DEPARTMENT     Chandan Fields MD  02/22/18 [id number]

## 2018-02-22 NOTE — ED AVS SNAPSHOT
[facility] Emergency Department    [address] [place] [address]    Phone:  [phone]    Fax:  [phone]                                       Connor Bowman   MRN: [id number]    Department:  [facility] Emergency Department   Date of Visit:  2/22/2018           After Visit Summary Signature Page     I have received my discharge instructions, and my questions have been answered. I have discussed any challenges I see with this plan with the nurse or doctor.    ..........................................................................................................................................  Patient/Patient Representative Signature      ..........................................................................................................................................  Patient Representative Print Name and Relationship to Patient    ..................................................               ................................................  Date                                            Time    ..........................................................................................................................................  Reviewed by Signature/Title    ...................................................              ..............................................  Date                                                            Time

## 2018-02-22 NOTE — ED NOTES
Patient states he has had sore thorat and fever for about a week now. Within the last 23 days having headaches and brown nasal discharge.

## 2018-02-22 NOTE — TELEPHONE ENCOUNTER
Routing refill request to provider for review/approval because:  BP not in range so RN unable to refill med.     Patient appears to be in ER for second time this week with viral illness and increased BP.    BP Readings from Last 3 Encounters:   02/22/18 (!) 157/96   02/19/18 (!) 171/106   12/10/17 (!) 151/99     Routing to provider to advise.  Alayna Ignacio RN

## 2018-02-22 NOTE — ED AVS SNAPSHOT
[facility] Emergency Department    [address] [place] [address]    Phone:  [phone]    Fax:  [phone]                                       Connor Bowman   MRN: [id number]    Department:  [facility] Emergency Department   Date of Visit:  2/22/2018           Patient Information     Date Of Birth          1980        Your diagnoses for this visit were:     Influenza-like illness     Acute recurrent maxillary sinusitis        You were seen by Chandan Fields MD.      Follow-up Information     Follow up with Dudley Fontana MD.    Specialty:  Family Practice    Why:  As needed    Contact information:    [address] [place] [id number] [phone]        Discharge References/Attachments     SINUSITIS (ANTIBIOTIC TREATMENT) (ENGLISH)      24 Hour Appointment Hotline       To make an appointment at any [facility] clinic, call [phone] ([phone]). If you don't have a family doctor or clinic, we will help you find one. [facility] clinics are conveniently located to serve the needs of you and your family.             Review of your medicines      START taking        Dose / Directions Last dose taken    amoxicillin-clavulanate 875-125 MG per tablet   Commonly known as:  AUGMENTIN   Dose:  1 tablet   Quantity:  20 tablet        Take 1 tablet by mouth 2 times daily for 10 days   Refills:  0          Our records show that you are taking the medicines listed below. If these are incorrect, please call your family doctor or clinic.        Dose / Directions Last dose taken    acetaminophen 500 MG tablet   Commonly known as:  TYLENOL   Dose:  1000 mg   Quantity:  100 tablet        Take 2 tablets (1,000 mg) by mouth every 6 hours as needed for pain or fever   Refills:  11        aspirin 325 MG tablet   Dose:  325 mg        Take 325 mg by mouth   Refills:  0        * fluticasone 50 MCG/ACT spray   Commonly known as:  FLONASE   Dose:  2 spray   Quantity:  1 Bottle         Spray 2 sprays into both nostrils daily   Refills:  3        * fluticasone 50 MCG/ACT spray   Commonly known as:  FLONASE   Dose:  2 spray   Quantity:  1 Bottle        Spray 2 sprays into both nostrils daily   Refills:  6        ibuprofen 200 MG tablet   Commonly known as:  ADVIL/MOTRIN   Dose:  600 mg        Take 3 tablets (600 mg) by mouth every 6 hours as needed for pain or fever   Refills:  0        OCUSOFT HYPOCHLOR Liqd   Dose:  2 spray        2 sprays 2 times daily OCuSOFT HypoChlor 0.02% Eyelid and Eyelash Spray 2 fl. OZ  Spray cotton applicator 2 times, rub gently 3 times along upper and then 3 times along lower lashes and then rub both lids. Let liquid air dry.  Use new applicator for other eye.   Refills:  0        propylene glycol 0.6 % Soln ophthalmic solution   Commonly known as:  SYSTANE BALANCE   Dose:  1 drop        Place 1 drop into both eyes 4 times daily Systane Balance   Refills:  0        SUMAtriptan 50 MG tablet   Commonly known as:  IMITREX   Dose:  50 mg   Quantity:  6 tablet        Take 1 tablet (50 mg) by mouth at onset of headache for migraine May repeat dose in 2 hours.  Do not exceed 200 mg in 24 hours   Refills:  3        * Notice:  This list has 2 medication(s) that are the same as other medications prescribed for you. Read the directions carefully, and ask your doctor or other care provider to review them with you.            Prescriptions were sent or printed at these locations (1 Prescription)                   [facility] [place] [address] [address] [facility] [address]    [address] , [facility] [address]    Telephone:  [phone]   Fax:  [phone]   Hours:                  E-Prescribed (1 of 1)         amoxicillin-clavulanate (AUGMENTIN) 875-125 MG per tablet                Procedures and tests performed during your visit     XR Sinus Complete G/E 3 Views      Orders Needing Specimen Collection     None      Pending Results     Date and Time Order Name Status  "Description    2018 1315 XR Sinus Complete G/E 3 Views Preliminary             Pending Culture Results     No orders found from 2018 to 2018.            Pending Results Instructions     If you had any lab results that were not finalized at the time of your Discharge, you can call the ED Lab Result RN at [phone]. You will be contacted by this team for any positive Lab results or changes in treatment. The nurses are available 7 days a week from 10A to 6:30P.  You can leave a message 24 hours per day and they will return your call.        Thank you for choosing [facility]       Thank you for choosing [facility] for your care. Our goal is always to provide you with excellent care. Hearing back from our patients is one way we can continue to improve our services. Please take a few minutes to complete the written survey that you may receive in the mail after you visit with us. Thank you!        [employer] Information     [employer] lets you send messages to your doctor, view your test results, renew your prescriptions, schedule appointments and more. To sign up, go to [email or web address] . Click on \"Log in\" on the left side of the screen, which will take you to the Welcome page. Then click on \"Sign up Now\" on the right side of the page.     You will be asked to enter the access code listed below, as well as some personal information. Please follow the directions to create your username and password.     Your access code is: [email or web address]  Expires: 2018 11:23 PM     Your access code will  in 90 days. If you need help or a new code, please call your [facility] clinic or [phone].        Care EveryWhere ID     This is your Care EveryWhere ID. This could be used by other organizations to access your [facility] medical records  [id number]        Equal Access to Services     SUMA TELLO : thee Tucker qaybta kaalmada adeegyada, waxay idiin hayaan adeeg kharash la'aan " ah. So [facility] [phone].    ATENCIÓN: Si habla español, tiene a winn disposición servicios gratuitos de asistencia lingüística. Llame al [phone].    We comply with applicable federal civil rights laws and [place] laws. We do not discriminate on the basis of race, color, national origin, age, disability, sex, sexual orientation, or gender identity.            After Visit Summary       This is your record. Keep this with you and show to your community pharmacist(s) and doctor(s) at your next visit.

## 2018-03-25 ENCOUNTER — HOSPITAL ENCOUNTER (EMERGENCY)
Facility: CLINIC | Age: 38
Discharge: HOME | End: 2018-03-25
Attending: FAMILY MEDICINE | Admitting: FAMILY MEDICINE
Payer: COMMERCIAL

## 2018-03-25 VITALS
RESPIRATION RATE: 18 BRPM | HEART RATE: 80 BPM | WEIGHT: 300 LBS | OXYGEN SATURATION: 97 % | SYSTOLIC BLOOD PRESSURE: 173 MMHG | BODY MASS INDEX: 38.52 KG/M2 | TEMPERATURE: 97.8 F | DIASTOLIC BLOOD PRESSURE: 119 MMHG

## 2018-03-25 DIAGNOSIS — T14.8XXA LOCAL INFECTION OF WOUND: ICD-10-CM

## 2018-03-25 DIAGNOSIS — I10 HYPERTENSION, UNSPECIFIED TYPE: ICD-10-CM

## 2018-03-25 DIAGNOSIS — L08.9 LOCAL INFECTION OF WOUND: ICD-10-CM

## 2018-03-25 DIAGNOSIS — S91.332A PUNCTURE WOUND OF LEFT FOOT, INITIAL ENCOUNTER: ICD-10-CM

## 2018-03-25 PROCEDURE — 99283 EMERGENCY DEPT VISIT LOW MDM: CPT | Mod: Z6 | Performed by: FAMILY MEDICINE

## 2018-03-25 PROCEDURE — 99283 EMERGENCY DEPT VISIT LOW MDM: CPT | Performed by: FAMILY MEDICINE

## 2018-03-25 PROCEDURE — 25000132 ZZH RX MED GY IP 250 OP 250 PS 637: Performed by: FAMILY MEDICINE

## 2018-03-25 RX ADMIN — AMOXICILLIN AND CLAVULANATE POTASSIUM 1 TABLET: 875; 125 TABLET, FILM COATED ORAL at 20:51

## 2018-03-25 ASSESSMENT — ENCOUNTER SYMPTOMS
WOUND: 1
COLOR CHANGE: 1
CHILLS: 0
FEVER: 0

## 2018-03-25 NOTE — ED AVS SNAPSHOT
[facility] Emergency Department    [address] [place] [address]    Phone:  [phone]    Fax:  [phone]                                       Connor Bowman   MRN: [id number]    Department:  [facility] Emergency Department   Date of Visit:  3/25/2018           After Visit Summary Signature Page     I have received my discharge instructions, and my questions have been answered. I have discussed any challenges I see with this plan with the nurse or doctor.    ..........................................................................................................................................  Patient/Patient Representative Signature      ..........................................................................................................................................  Patient Representative Print Name and Relationship to Patient    ..................................................               ................................................  Date                                            Time    ..........................................................................................................................................  Reviewed by Signature/Title    ...................................................              ..............................................  Date                                                            Time

## 2018-03-26 NOTE — ED NOTES
Pt states he stepped on a nail yesterday, with his shoes on.  Today the wound is more sore and swollen.

## 2018-03-26 NOTE — DISCHARGE INSTRUCTIONS
Please read and follow the handout(s) instructions. Return, if needed, for increased or worsening symptoms and as directed by the handout(s).    Yogurt orally twice a day while on the antibiotics may help prevent diarrhea and secondary infections caused by the antibiotic use.    Your blood pressure was noted to be somewhat elevated today and recommend that he make an appointment in your clinic to have her rechecked in the next couple weeks.    If your foot is not improved in the next 2-3 days then please return to the ER or be seen in your clinic for recheck as we may need to change treatment medications.    I hope you feel better soon!    Electronically signed, Jordon Harrison DO

## 2018-03-26 NOTE — ED PROVIDER NOTES
History     Chief Complaint   Patient presents with     Puncture Wound     HPI  Connor Bowman is a 37 year old male who presents to the ER with concerns about redness and pain to his left foot.  He states that he stepped on a nail yesterday with his shoes on causing an injury to the bottom of the foot.  Today's notes that it has been much more sore and appears swollen and slightly red.  States that he is up-to-date on his tetanus receiving booster in December.  He denies fever or chills but that he should get it checked before he gets to infected.      Problem List:    Patient Active Problem List    Diagnosis Date Noted     Dry eyes 10/07/2017     Priority: Medium     Punctate keratitis of right eye 10/05/2017     Priority: Medium     Tear of medial meniscus of right knee, current, unspecified tear type, subsequent encounter 12/05/2016     Priority: Medium     Derangement of posterior horn of medial meniscus 02/15/2012     Priority: Medium     Migraines 01/02/2012     Priority: Medium     Knee pain 01/02/2012     Priority: Medium     CARDIOVASCULAR SCREENING; LDL GOAL LESS THAN 130 10/31/2010     Priority: Medium     Morbid obesity (H) 04/29/2010     Priority: Medium     Hypertension, goal below 140/90      Priority: Medium     Adiposity 02/16/2007     Priority: Medium        Past Medical History:    Past Medical History:   Diagnosis Date     GERD (gastroesophageal reflux disease)      Unspecified essential hypertension        Past Surgical History:    Past Surgical History:   Procedure Laterality Date     GI SURGERY  5/2010    Gastric band     KNEE SURGERY      arthroscopic       Family History:    Family History   Problem Relation Age of Onset     CANCER Mother      colorectal cancer     HEART DISEASE Maternal Grandmother      HEART DISEASE Maternal Grandfather      HEART DISEASE Brother      bicuspid aortic valve     Hypertension Father      CANCER Paternal Grandmother      uterine CA     Arthritis Paternal  Grandfather      RA       Social History:  Marital Status:   [2]  Social History   Substance Use Topics     Smoking status: Never Smoker     Smokeless tobacco: Never Used     Alcohol use No        Medications:      ibuprofen (ADVIL/MOTRIN) 200 MG tablet   SUMAtriptan (IMITREX) 50 MG tablet   acetaminophen (TYLENOL) 500 MG tablet   propylene glycol (SYSTANE BALANCE) 0.6 % SOLN ophthalmic solution   Eyelid Cleansers (OCUSOFT HYPOCHLOR) LIQD   fluticasone (FLONASE) 50 MCG/ACT spray   fluticasone (FLONASE) 50 MCG/ACT spray   aspirin 325 MG tablet     No Known Allergies    Immunization History   Administered Date(s) Administered     DTAP (<7y) 1980, 1980, 1980, 05/12/1983, 01/17/1986     HepB 06/29/1993, 07/06/1993, 12/21/1993     MMR 10/22/1982, 06/23/1993     OPV, trivalent, live 1981, 1981, 05/12/1983, 01/17/1986     TD (ADULT, 7+) 06/27/1995, 03/09/2005     TDAP Vaccine (Adacel) 12/10/2017         Review of Systems   Constitutional: Negative for chills and fever.   Skin: Positive for color change (left foot ) and wound.   All other systems reviewed and are negative.      Physical Exam   BP: (!) 173/119  Pulse: 80  Temp: 97.8  F (36.6  C)  Resp: 18  Weight: 136.1 kg (300 lb)  SpO2: 97 %      Physical Exam   Musculoskeletal:        Left foot: There is tenderness, swelling and laceration (puncture wound site).        Feet:    See photo.  No suggestion of significant abscess or area of fluctuance identified.   Nursing note and vitals reviewed.          ED Course     ED Course     Procedures               Critical Care time:  none            Medications   amoxicillin-clavulanate (AUGMENTIN) 875-125 MG per tablet 1 tablet (not administered)       Assessments & Plan (with Medical Decision Making)  37-year-old to the ER secondary concerns of a puncture with a nail to his foot that occurred yesterday now with increasing tenderness to the area with some redness and swelling.  Patient is  up-to-date on his tetanus immunization.  Exam findings consistent with early cellulitis to the area of puncture but without obvious abscess.  No foreign body could be identified on exam.  Patient is found to be significantly hypertensive.  He was encouraged to follow-up his clinic for recheck of the blood pressure in the next couple weeks.  Patient encouraged to do warm water soaks to back like a fever to help with this infection.  Is also initiated on antibiotic as listed below.  If not improved over the next 2-3 days or if the redness extends significantly beyond the outlined area, then to return to the ER for recheck.     I have reviewed the nursing notes.    I have reviewed the findings, diagnosis, plan and need for follow up with the patient.       New Prescriptions    AMOXICILLIN-CLAVULANATE (AUGMENTIN) 875-125 MG PER TABLET    Take 1 tablet by mouth 2 times daily for 13 doses          I verbally discussed the findings of the evaluation today in the ER. I have verbally discussed with Connor the suggested treatment(s) as described in the discharge instructions and handouts. I have prescribed the above listed medications and instructed him on appropriate use of these medications.      I have verbally suggested he follow-up in his clinic or return to the ER for increased symptoms. See the follow-up recommendations documented  in the after visit summary in this visit's EPIC chart.    Final diagnoses:   Puncture wound of left foot, initial encounter   Local infection of wound - left foot   Hypertension, unspecified type       3/25/2018   [facility] EMERGENCY DEPARTMENT     Jordon Harrison,   03/25/18 2050

## 2018-07-02 ENCOUNTER — OFFICE VISIT (OUTPATIENT)
Dept: OPHTHALMOLOGY | Facility: CLINIC | Age: 38
End: 2018-07-02
Payer: COMMERCIAL

## 2018-07-02 DIAGNOSIS — H10.401 CHRONIC CONJUNCTIVITIS OF RIGHT EYE, UNSPECIFIED CHRONIC CONJUNCTIVITIS TYPE: Primary | ICD-10-CM

## 2018-07-02 PROCEDURE — 92002 INTRM OPH EXAM NEW PATIENT: CPT | Performed by: OPHTHALMOLOGY

## 2018-07-02 RX ORDER — NEOMYCIN SULFATE, POLYMYXIN B SULFATE AND DEXAMETHASONE 3.5; 10000; 1 MG/ML; [USP'U]/ML; MG/ML
1-2 SUSPENSION/ DROPS OPHTHALMIC 3 TIMES DAILY
Qty: 1 BOTTLE | Refills: 1 | Status: SHIPPED | OUTPATIENT
Start: 2018-07-02 | End: 2018-09-18

## 2018-07-02 ASSESSMENT — VISUAL ACUITY
METHOD: SNELLEN - LINEAR
OD_SC: 20/20
OS_SC: 20/20

## 2018-07-02 NOTE — MR AVS SNAPSHOT
After Visit Summary   7/2/2018    Connor Bowman    MRN: [id number]           Patient Information     Date Of Birth          1980        Visit Information        Provider Department      7/2/2018 10:45 AM Dagoberto Richardson MD [facility]        Today's Diagnoses     Chronic conjunctivitis of right eye, unspecified chronic conjunctivitis type    -  1      Care Instructions    Cam/Dex drop three times daily right eye for 2 weeks.  Return visit for an MD check in 3 weeks.  Dagoberto Ricahrdson M.D.  [phone]            Follow-ups after your visit        Who to contact     If you have questions or need follow up information about today's clinic visit or your schedule please contact [facility] directly at [phone].  Normal or non-critical lab and imaging results will be communicated to you by MyChart, letter or phone within 4 business days after the clinic has received the results. If you do not hear from us within 7 days, please contact the clinic through MyChart or phone. If you have a critical or abnormal lab result, we will notify you by phone as soon as possible.  Submit refill requests through PANOS[employer] or call your pharmacy and they will forward the refill request to us. Please allow 3 business days for your refill to be completed.          Additional Information About Your Visit        Care EveryWhere ID     This is your Care EveryWhere ID. This could be used by other organizations to access your [facility] medical records  [id number]         Blood Pressure from Last 3 Encounters:   03/25/18 (!) 173/119   02/22/18 (!) 157/96   02/19/18 (!) 171/106    Weight from Last 3 Encounters:   03/25/18 136.1 kg (300 lb)   02/22/18 (!) 145.2 kg (320 lb)   02/19/18 (!) 145.2 kg (320 lb)              Today, you had the following     No orders found for display         Today's Medication Changes          These changes are accurate as of 7/2/18 11:53 AM.  If you have any  questions, ask your nurse or doctor.               Start taking these medicines.        Dose/Directions    neomycin-polymyxin-dexamethasone 3.5-20067-6.1 Susp ophthalmic susp   Commonly known as:  MAXITROL   Used for:  Chronic conjunctivitis of right eye, unspecified chronic conjunctivitis type        Dose:  1-2 drop   Place 1-2 drops into the right eye 3 times daily   Quantity:  1 Bottle   Refills:  1            Where to get your medicines      These medications were sent to [facility] [place] - [place], [place] - [address]  [address], [place] [address]     Phone:  [phone]     neomycin-polymyxin-dexamethasone 3.5-35204-7.1 Susp ophthalmic susp                Primary Care Provider Office Phone # Fax #    Dudley Fontana [phone] [address] [place] [address]        Equal Access to Services     [facility]: Hadii nay hinds hadasho Soninoska, waaxda luqadaha, qaybta kaalmada nallelyyada, tami john . So [facility] [phone].    ATENCIÓN: Si habla español, tiene a winn disposición servicios gratuitos de asistencia lingüística. [facility] [phone].    We comply with applicable federal civil rights laws and [place] laws. We do not discriminate on the basis of race, color, national origin, age, disability, sex, sexual orientation, or gender identity.            Thank you!     Thank you for choosing [facility]  for your care. Our goal is always to provide you with excellent care. Hearing back from our patients is one way we can continue to improve our services. Please take a few minutes to complete the written survey that you may receive in the mail after your visit with us. Thank you!             Your Updated Medication List - Protect others around you: Learn how to safely use, store and throw away your medicines at www.disposemymeds.org.          This list is accurate as of 7/2/18 11:53 AM.  Always use  your most recent med list.                   Brand Name Dispense Instructions for use Diagnosis    acetaminophen 500 MG tablet    TYLENOL    100 tablet    Take 2 tablets (1,000 mg) by mouth every 6 hours as needed for pain or fever        aspirin 325 MG tablet      Take 325 mg by mouth        * fluticasone 50 MCG/ACT spray    FLONASE    1 Bottle    Spray 2 sprays into both nostrils daily    Snoring       * fluticasone 50 MCG/ACT spray    FLONASE    1 Bottle    Spray 2 sprays into both nostrils daily    Acute sinusitis with symptoms > 10 days       ibuprofen 200 MG tablet    ADVIL/MOTRIN     Take 3 tablets (600 mg) by mouth every 6 hours as needed for pain or fever        neomycin-polymyxin-dexamethasone 3.5-55983-1.1 Susp ophthalmic susp    MAXITROL    1 Bottle    Place 1-2 drops into the right eye 3 times daily    Chronic conjunctivitis of right eye, unspecified chronic conjunctivitis type       OCUSOFT HYPOCHLOR Liqd      2 sprays 2 times daily OCuSOFT HypoChlor 0.02% Eyelid and Eyelash Spray 2 fl. OZ  Spray cotton applicator 2 times, rub gently 3 times along upper and then 3 times along lower lashes and then rub both lids. Let liquid air dry.  Use new applicator for other eye.    Dry eyes       propylene glycol 0.6 % Soln ophthalmic solution    SYSTANE BALANCE     Place 1 drop into both eyes 4 times daily Systane Balance    Dry eyes       SUMAtriptan 50 MG tablet    IMITREX    6 tablet    TAKE 1 TABLET (50 MG) BY MOUTH AT ONSET OF HEADACHE FOR MIGRAINE. MAY REPEAT DOSE IN 2 HOURS. DO NOT EXCEED 200 MG IN 24 HOURS    Intractable periodic headache syndrome       * Notice:  This list has 2 medication(s) that are the same as other medications prescribed for you. Read the directions carefully, and ask your doctor or other care provider to review them with you.

## 2018-07-02 NOTE — PROGRESS NOTES
Current Eye Medications:  no     Subjective:  Mattery right eye  As above plus for the past 5- 6 mos, this eye will be stuck shut with matter in the morning , will sometimes have to remove some matter during the day. Pt also reports his vision will blur sometimes ,blinking  will clear his vision.    Tried artificial tears.  Hx of LASIK at [facility] 12/17.  Sx preceded LASIK.  Wore DWSCL for about -3.50 myopia previously     Objective:  See Ophthalmology Exam.       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      Plan:  Cam/Dex drop three times daily right eye for 2 weeks.  Return visit for an MD check in 3 weeks.  Dagoberto Richardson M.D.  [phone]

## 2018-07-02 NOTE — PATIENT INSTRUCTIONS
Cam/Dex drop three times daily right eye for 2 weeks.  Return visit for an MD check in 3 weeks.  Dagoberto Richardson M.D.  [phone]

## 2018-07-02 NOTE — LETTER
7/2/2018         RE: Connor Bowman  [address] [facility] [address]        Dear Colleague,    Thank you for referring your patient, Connor Bowman, to the [facility]. Please see a copy of my visit note below.     Current Eye Medications:  no     Subjective:  Mattery right eye  As above plus for the past 5- 6 mos, this eye will be stuck shut with matter in the morning , will sometimes have to remove some matter during the day. Pt also reports his vision will blur sometimes ,blinking  will clear his vision.    Tried artificial tears.  Hx of LASIK at [facility] 12/17.  Sx preceded LASIK.  Wore DWSCL for about -3.50 myopia previously     Objective:  See Ophthalmology Exam.       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      Plan:  Cam/Dex drop three times daily right eye for 2 weeks.  Return visit for an MD check in 3 weeks.  Dagoberto Richardson M.D.  [phone]        Again, thank you for allowing me to participate in the care of your patient.        Sincerely,        Dagoberto Richardson MD

## 2018-07-04 PROBLEM — H10.401: Status: ACTIVE | Noted: 2018-07-04

## 2018-07-04 ASSESSMENT — EXTERNAL EXAM - LEFT EYE: OS_EXAM: NORMAL

## 2018-07-04 ASSESSMENT — SLIT LAMP EXAM - LIDS
COMMENTS: NORMAL
COMMENTS: NORMAL

## 2018-07-04 ASSESSMENT — EXTERNAL EXAM - RIGHT EYE: OD_EXAM: NORMAL

## 2018-07-15 ENCOUNTER — OFFICE VISIT (OUTPATIENT)
Dept: URGENT CARE | Facility: URGENT CARE | Age: 38
End: 2018-07-15
Payer: COMMERCIAL

## 2018-07-15 VITALS
TEMPERATURE: 98.1 F | SYSTOLIC BLOOD PRESSURE: 167 MMHG | OXYGEN SATURATION: 99 % | RESPIRATION RATE: 18 BRPM | DIASTOLIC BLOOD PRESSURE: 98 MMHG | HEART RATE: 94 BPM | WEIGHT: 315 LBS | BODY MASS INDEX: 41.6 KG/M2

## 2018-07-15 DIAGNOSIS — I10 HYPERTENSION, GOAL BELOW 140/90: ICD-10-CM

## 2018-07-15 DIAGNOSIS — G43.819 OTHER MIGRAINE WITHOUT STATUS MIGRAINOSUS, INTRACTABLE: Primary | ICD-10-CM

## 2018-07-15 PROCEDURE — 99213 OFFICE O/P EST LOW 20 MIN: CPT | Performed by: PHYSICIAN ASSISTANT

## 2018-07-15 RX ORDER — SUMATRIPTAN 50 MG/1
50 TABLET, FILM COATED ORAL
Qty: 18 TABLET | Refills: 1 | Status: SHIPPED | OUTPATIENT
Start: 2018-07-15

## 2018-07-15 RX ORDER — LISINOPRIL 20 MG/1
20 TABLET ORAL DAILY
Qty: 30 TABLET | Refills: 1 | Status: SHIPPED | OUTPATIENT
Start: 2018-07-15 | End: 2018-10-03 | Stop reason: DRUGHIGH

## 2018-07-15 ASSESSMENT — ENCOUNTER SYMPTOMS
SEIZURES: 0
CONSTITUTIONAL NEGATIVE: 1
LOSS OF CONSCIOUSNESS: 0
RESPIRATORY NEGATIVE: 1
PALPITATIONS: 0
WEIGHT LOSS: 0
GASTROINTESTINAL NEGATIVE: 1
TINGLING: 0
HEMOPTYSIS: 0
COUGH: 0
HEADACHES: 1
SENSORY CHANGE: 0
DIAPHORESIS: 0
SPEECH CHANGE: 0
CARDIOVASCULAR NEGATIVE: 1
DIZZINESS: 0
EYE PAIN: 0
TREMORS: 0
FEVER: 0
FOCAL WEAKNESS: 0

## 2018-07-15 NOTE — MR AVS SNAPSHOT
After Visit Summary   7/15/2018    Connor Bowman    MRN: [id number]           Patient Information     Date Of Birth          1980        Visit Information        Provider Department      7/15/2018 9:05 AM Nena Wilkerson PA-C [facility]        Today's Diagnoses     Other migraine without status migrainosus, intractable    -  1       Follow-ups after your visit        Who to contact     If you have questions or need follow up information about today's clinic visit or your schedule please contact [facility] directly at [phone].  Normal or non-critical lab and imaging results will be communicated to you by MyChart, letter or phone within 4 business days after the clinic has received the results. If you do not hear from us within 7 days, please contact the clinic through MyChart or phone. If you have a critical or abnormal lab result, we will notify you by phone as soon as possible.  Submit refill requests through [employer] or call your pharmacy and they will forward the refill request to us. Please allow 3 business days for your refill to be completed.          Additional Information About Your Visit        Care EveryWhere ID     This is your Care EveryWhere ID. This could be used by other organizations to access your [place] medical records  [id number]        Your Vitals Were     Pulse Temperature Respirations Pulse Oximetry BMI (Body Mass Index)       94 98.1  F (36.7  C) (Oral) 18 99% 41.6 kg/m2        Blood Pressure from Last 3 Encounters:   07/15/18 (!) 173/98   03/25/18 (!) 173/119   02/22/18 (!) 157/96    Weight from Last 3 Encounters:   07/15/18 324 lb (147 kg)   03/25/18 300 lb (136.1 kg)   02/22/18 (!) 320 lb (145.2 kg)              Today, you had the following     No orders found for display         Today's Medication Changes          These changes are accurate as of 7/15/18  9:20 AM.  If you have any questions, ask your nurse or doctor.                Start taking these medicines.        Dose/Directions    SUMAtriptan 50 MG tablet   Commonly known as:  IMITREX   Used for:  Other migraine without status migrainosus, intractable   Started by:  Nena Wilkerson PA-C        Dose:  50 mg   Take 1 tablet (50 mg) by mouth at onset of headache for migraine May repeat in 2 hours. Max 4 tablets/24 hours.   Quantity:  18 tablet   Refills:  1            Where to get your medicines      These medications were sent to [facility] [id number] [place] [address] [address] [place] [address] AT [facility]  [address] [address]     Phone:  [phone]     SUMAtriptan 50 MG tablet                Primary Care Provider Office Phone # Fax #    Dudley Fontana [phone] [address] [place] [address]        Equal Access to Services     [facility]: Hadii nay hinds hadasho Soomaali, waaxda luqadaha, qaybta kaalmada adeegyada, tami marquis haymarni john . So [facility] [phone].    ATENCIÓN: Si habla español, tiene a winn disposición servicios gratuitos de asistencia lingüística. Llame al [phone].    We comply with applicable federal civil rights laws and [place] laws. We do not discriminate on the basis of race, color, national origin, age, disability, sex, sexual orientation, or gender identity.            Thank you!     Thank you for choosing [facility]  for your care. Our goal is always to provide you with excellent care. Hearing back from our patients is one way we can continue to improve our services. Please take a few minutes to complete the written survey that you may receive in the mail after your visit with us. Thank you!             Your Updated Medication List - Protect others around you: Learn how to safely use, store and throw away your medicines at www.disposemymeds.org.          This list is accurate as of 7/15/18  9:20 AM.  Always use your most recent med list.                    Brand Name Dispense Instructions for use Diagnosis    acetaminophen 500 MG tablet    TYLENOL    100 tablet    Take 2 tablets (1,000 mg) by mouth every 6 hours as needed for pain or fever        aspirin 325 MG tablet      Take 325 mg by mouth        fluticasone 50 MCG/ACT spray    FLONASE    1 Bottle    Spray 2 sprays into both nostrils daily    Acute sinusitis with symptoms > 10 days       ibuprofen 200 MG tablet    ADVIL/MOTRIN     Take 3 tablets (600 mg) by mouth every 6 hours as needed for pain or fever        neomycin-polymyxin-dexamethasone 3.5-11007-1.1 Susp ophthalmic susp    MAXITROL    1 Bottle    Place 1-2 drops into the right eye 3 times daily    Chronic conjunctivitis of right eye, unspecified chronic conjunctivitis type       OCUSOFT HYPOCHLOR Liqd      2 sprays 2 times daily OCuSOFT HypoChlor 0.02% Eyelid and Eyelash Spray 2 fl. OZ  Spray cotton applicator 2 times, rub gently 3 times along upper and then 3 times along lower lashes and then rub both lids. Let liquid air dry.  Use new applicator for other eye.    Dry eyes       propylene glycol 0.6 % Soln ophthalmic solution    SYSTANE BALANCE     Place 1 drop into both eyes 4 times daily Systane Balance    Dry eyes       SUMAtriptan 50 MG tablet    IMITREX    18 tablet    Take 1 tablet (50 mg) by mouth at onset of headache for migraine May repeat in 2 hours. Max 4 tablets/24 hours.    Other migraine without status migrainosus, intractable

## 2018-07-15 NOTE — NURSING NOTE
"Chief Complaint   Patient presents with     Headache     Per pt gets migraines and was wondering if he can get sumatriptan refill as he is going on a trip. Headache on and off for a while looking to get med.       Initial BP (!) 173/98  Pulse 94  Temp 98.1  F (36.7  C) (Oral)  Resp 18  Wt 324 lb (147 kg)  SpO2 99%  BMI 41.6 kg/m2 Estimated body mass index is 41.6 kg/(m^2) as calculated from the following:    Height as of 9/25/17: 6' 2\" (1.88 m).    Weight as of this encounter: 324 lb (147 kg).  Medication Reconciliation: complete      Brooke Cox MA    "

## 2018-07-15 NOTE — PROGRESS NOTES
SUBJECTIVE:     HPI  Connor Bowman is a 38 year old male who presents to clinic today for the following health issues:  Migraine medication refill    Duration: chronic and intermittent HAs.  Needs refill on his imitrex as he will be going out of town.  Gets migraine HAs once every 2months or so.  No current HA.      Description (location/character/radiation): frontal and temporal    Intensity:  moderate    Accompanying signs and symptoms: when gets migraines he will usually get an aura with it along with light and sound sensitivities.  No visual disturbances, dizziness, one sided weakness or slurred speech.  No chest pain, SOB, palpitations, orthopnea, PND or peripheral edema.  No abdominal pain, n/v, constipation, diarrhea, bloody or black tarry stools.  No URI symptoms.  No fever, chills or sweats.      History (similar episodes/previous evaluation): Yes    Precipitating or alleviating factors: long distance driving, stress    Therapies tried and outcome: imitrex, ibuprofen and tylenol with good relief.        Reviewed PMH.  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     Morbid obesity (H)     Adiposity     Tear of medial meniscus of right knee, current, unspecified tear type, subsequent encounter     Punctate keratitis of right eye     Dry eyes     Chronic conjunctivitis of right eye, unspecified chronic conjunctivitis type     Current Outpatient Prescriptions   Medication Sig Dispense Refill     acetaminophen (TYLENOL) 500 MG tablet Take 2 tablets (1,000 mg) by mouth every 6 hours as needed for pain or fever 100 tablet 11     aspirin 325 MG tablet Take 325 mg by mouth       Eyelid Cleansers (OCUSOFT HYPOCHLOR) LIQD 2 sprays 2 times daily OCuSOFT HypoChlor 0.02% Eyelid and Eyelash Spray 2 fl. OZ  Spray cotton applicator 2 times, rub gently 3  times along upper and then 3 times along lower lashes and then rub both lids. Let liquid air dry.  Use new applicator for other eye.       fluticasone (FLONASE) 50 MCG/ACT spray Spray 2 sprays into both nostrils daily 1 Bottle 6     ibuprofen (ADVIL/MOTRIN) 200 MG tablet Take 3 tablets (600 mg) by mouth every 6 hours as needed for pain or fever       neomycin-polymyxin-dexamethasone (MAXITROL) 3.5-49617-8.1 SUSP ophthalmic susp Place 1-2 drops into the right eye 3 times daily 1 Bottle 1     propylene glycol (SYSTANE BALANCE) 0.6 % SOLN ophthalmic solution Place 1 drop into both eyes 4 times daily Systane Balance       SUMAtriptan (IMITREX) 50 MG tablet Take 1 tablet (50 mg) by mouth at onset of headache for migraine May repeat in 2 hours. Max 4 tablets/24 hours. 18 tablet 1     No Known Allergies    Review of Systems   Constitutional: Negative.  Negative for diaphoresis, fever and weight loss.   Eyes: Negative for pain.   Respiratory: Negative.  Negative for cough and hemoptysis.    Cardiovascular: Negative.  Negative for chest pain and palpitations.   Gastrointestinal: Negative.    Skin: Negative.    Neurological: Positive for headaches. Negative for dizziness, tingling, tremors, sensory change, speech change, focal weakness, seizures and loss of consciousness.   All other systems reviewed and are negative.      BP (!) 173/98  Pulse 94  Temp 98.1  F (36.7  C) (Oral)  Resp 18  Wt 324 lb (147 kg)  SpO2 99%  BMI 41.6 kg/m2  Physical Exam   Constitutional: He is oriented to person, place, and time and well-developed, well-nourished, and in no distress. No distress.   HENT:   Head: Normocephalic and atraumatic.   Nose: Nose normal.   Mouth/Throat: Uvula is midline and oropharynx is clear and moist. No oropharyngeal exudate or posterior oropharyngeal erythema.   TMs are intact without any erythema or bulging bilaterally.  Airway is patent.   Eyes: Conjunctivae and EOM are normal. Pupils are equal, round, and  reactive to light. No scleral icterus.   Neck: Normal range of motion and full passive range of motion without pain. Neck supple. No Brudzinski's sign and no Kernig's sign noted. No thyromegaly present.   Cardiovascular: Normal rate, regular rhythm, normal heart sounds and intact distal pulses.  Exam reveals no gallop and no friction rub.    No murmur heard.  Pulmonary/Chest: Effort normal and breath sounds normal. No respiratory distress. He has no wheezes. He has no rales.   Lymphadenopathy:     He has no cervical adenopathy.   Neurological: He is alert and oriented to person, place, and time. He has normal motor skills, normal sensation, normal strength, normal reflexes and intact cranial nerves. He displays no weakness, facial symmetry and normal speech. No cranial nerve deficit. He has a normal Romberg Test. He shows no pronator drift. Gait normal. Coordination normal.   Skin: Skin is warm and dry. He is not diaphoretic.   Psychiatric: Mood, memory, affect and judgment normal.   Nursing note and vitals reviewed.        Assessment/Plan:  Other migraine without status migrainosus, intractable:  This is chronic and intermittent occurring every 2months or so. No current HA today.  No focal neurologic deficits. Will refill imitrex as directed. Avoid triggers.  Keep HA diary.  RTC if symptoms persist or to the ER if he develops worsening HAs, visual changes, one sided weakness or slurred speech.    -     SUMAtriptan (IMITREX) 50 MG tablet; Take 1 tablet (50 mg) by mouth at onset of headache for migraine May repeat in 2 hours. Max 4 tablets/24 hours.    Hypertension, goal below 140/90:  BP elevated today in clinic.  Will restart his lisinopril and encouraged low sodium diet with limited caffeine and regular exercise.  F/U with his PCP in 2weeks for BP recheck and BMP.    -     lisinopril (PRINIVIL/ZESTRIL) 20 MG tablet; Take 1 tablet (20 mg) by mouth daily          Nena Wilkerson PA-C

## 2018-07-15 NOTE — PROGRESS NOTES
"  SUBJECTIVE:     HPI  Connor Bowman is a 38 year old male who presents to clinic today for the following health issues:  Headaches    Duration: ***    Description  Location: {headaceh description:795451}   Character: {headaceh description:867633}  Frequency:  ***  Duration:  ***    Intensity:  {mild,moderate,severe:896295}    Accompanying signs and symptoms:    Precipitating or Alleviating factors:  Nausea/vomiting: {YES NO SOMETIMES:649779::\"no\"}  Dizziness: {YES NO SOMETIMES:803236::\"no\"}  Weakness or numbness: {YES NO SOMETIMES:544846::\"no\"}  Visual changes: {VISUAL STIGMATA:100097::\"none\"}  Fever: { :161326}  Sinus or URI symptoms {please list if present:221504}    History  Head trauma: { :539099::\"no\"}  Family history of migraines: { :962430::\"no\"}  Previous tests for headaches: { :635979::\"no\"}  Neurologist evaluations: { :390330::\"no\"}  Able to do daily activities when headache present: { :968165::\"no\"}  Wake with headaches: { :302999::\"no\"}  Daily pain medication use: { :037595::\"no\"}  Any changes in: {STRESSOR:042404}    Precipitating or Alleviating factors (light/sound/sleep/caffeine): ***    Therapies tried and outcome: {.:801645}    Outcome - {HELPFUL:774696}  Frequent/daily pain medication use: { :198769::\"no\"}      Reviewed PMH.  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     Morbid obesity (H)     Adiposity     Tear of medial meniscus of right knee, current, unspecified tear type, subsequent encounter     Punctate keratitis of right eye     Dry eyes     Chronic conjunctivitis of right eye, unspecified chronic conjunctivitis type     Current Outpatient Prescriptions   Medication Sig Dispense Refill     acetaminophen (TYLENOL) 500 MG tablet Take 2 tablets (1,000 mg) by mouth every 6 hours as needed for pain or fever 100 tablet 11     " aspirin 325 MG tablet Take 325 mg by mouth       Eyelid Cleansers (OCUSOFT HYPOCHLOR) LIQD 2 sprays 2 times daily OCuSOFT HypoChlor 0.02% Eyelid and Eyelash Spray 2 fl. OZ  Spray cotton applicator 2 times, rub gently 3 times along upper and then 3 times along lower lashes and then rub both lids. Let liquid air dry.  Use new applicator for other eye.       fluticasone (FLONASE) 50 MCG/ACT spray Spray 2 sprays into both nostrils daily 1 Bottle 6     fluticasone (FLONASE) 50 MCG/ACT spray Spray 2 sprays into both nostrils daily 1 Bottle 3     ibuprofen (ADVIL/MOTRIN) 200 MG tablet Take 3 tablets (600 mg) by mouth every 6 hours as needed for pain or fever       neomycin-polymyxin-dexamethasone (MAXITROL) 3.5-45056-4.1 SUSP ophthalmic susp Place 1-2 drops into the right eye 3 times daily 1 Bottle 1     propylene glycol (SYSTANE BALANCE) 0.6 % SOLN ophthalmic solution Place 1 drop into both eyes 4 times daily Systane Balance       SUMAtriptan (IMITREX) 50 MG tablet TAKE 1 TABLET (50 MG) BY MOUTH AT ONSET OF HEADACHE FOR MIGRAINE. MAY REPEAT DOSE IN 2 HOURS. DO NOT EXCEED 200 MG IN 24 HOURS 6 tablet 3     No Known Allergies    ROS    BP (!) 173/98  Pulse 94  Temp 98.1  F (36.7  C) (Oral)  Resp 18  Wt 324 lb (147 kg)  SpO2 99%  BMI 41.6 kg/m2  Physical Exam      Assessment/Plan:  There are no diagnoses linked to this encounter.      Nena Wilkerson PA-C

## 2018-09-17 NOTE — PROGRESS NOTES
"  SUBJECTIVE:   Connor Bowman is a 38 year old male who presents to clinic today for the following health issues:      History of Present Illness     Hypertension:     Outpatient blood pressures:  Are being checked    Blood pressures checked at:  Home    Dietary sodium intake::  Not monitoring salt intake    Diet:  Regular (no restrictions)  Frequency of exercise:  2-3 days/week  Duration of exercise:  15-30 minutes  Taking medications regularly:  Yes  Medication side effects:  None  Additional concerns today:  No    Patient took last lisinopril Saturday night. He was monitoring his BP at home when he was taking the medication and it was running consistently in the 140s-150s/90s. No headaches, chest pain. No side effects from medication.     Answers for HPI/ROS submitted by the patient on 9/18/2018   PHQ-2 Score: 0    Chronic conjunctivitis - he has had this ongoing and was seen in July by ophthalmology and prescribed drops. These have been keeping his symptoms well controlled and he would like refills of these. He has also been using Flonase to keep his nasal passages clear and this seems to help reduce the drainage in his eye.    Problem list and histories reviewed & adjusted, as indicated.  Additional history: as documented    BP Readings from Last 3 Encounters:   09/18/18 (!) 122/104   07/15/18 (!) 167/98   03/25/18 (!) 173/119    Wt Readings from Last 3 Encounters:   09/18/18 323 lb 14.4 oz (146.9 kg)   07/15/18 324 lb (147 kg)   03/25/18 300 lb (136.1 kg)                  Labs reviewed in EPIC    ROS:  Constitutional, HEENT, cardiovascular, pulmonary, gi and gu systems are negative, except as otherwise noted.    OBJECTIVE:     BP (!) 122/104  Pulse 60  Temp 97.3  F (36.3  C) (Temporal)  Resp 12  Ht 6' 2.02\" (1.88 m)  Wt 323 lb 14.4 oz (146.9 kg)  BMI 41.57 kg/m2  Body mass index is 41.57 kg/(m^2).  GENERAL: alert and no acute distress  EYES: grossly normal to inspection, conjunctiva and sclera " clear  RESP: lungs clear to auscultation - no rales, rhonchi or wheezes  CV: regular rate and rhythm, normal S1 S2, no S3 or S4, no murmur, click or rub, no peripheral edema  MS: no gross musculoskeletal defects noted, no edema  SKIN: no suspicious lesions or rashes  NEURO: Normal strength and tone, mentation intact and speech normal  PSYCH: mentation appears normal, affect normal/bright    Diagnostic Test Results:  Results for orders placed or performed in visit on 09/18/18 (from the past 24 hour(s))   BASIC METABOLIC PANEL   Result Value Ref Range    Sodium 140 133 - 144 mmol/L    Potassium 4.5 3.4 - 5.3 mmol/L    Chloride 105 94 - 109 mmol/L    Carbon Dioxide 29 20 - 32 mmol/L    Anion Gap 6 3 - 14 mmol/L    Glucose 98 70 - 99 mg/dL    Urea Nitrogen 15 7 - 30 mg/dL    Creatinine 0.99 0.66 - 1.25 mg/dL    GFR Estimate 85 >60 mL/min/1.7m2    GFR Estimate If Black >90 >60 mL/min/1.7m2    Calcium 9.2 8.5 - 10.1 mg/dL   Lipid panel reflex to direct LDL Fasting   Result Value Ref Range    Cholesterol 208 (H) <200 mg/dL    Triglycerides 200 (H) <150 mg/dL    HDL Cholesterol 40 >39 mg/dL    LDL Cholesterol Calculated 128 (H) <100 mg/dL    Non HDL Cholesterol 168 (H) <130 mg/dL       ASSESSMENT/PLAN:     1. Uncontrolled hypertension  Uncontrolled. Increase dose of lisinopril to 40 mg daily. Nurse BP check in 10-14 days.   - lisinopril (PRINIVIL/ZESTRIL) 40 MG tablet; Take 1 tablet (40 mg) by mouth daily  Dispense: 14 tablet; Refill: 0    2. Chronic conjunctivitis of right eye, unspecified chronic conjunctivitis type  Stable with current medications. Refills granted.   - fluticasone (FLONASE) 50 MCG/ACT spray; Spray 2 sprays into both nostrils daily  Dispense: 1 Bottle; Refill: 6  - neomycin-polymyxin-dexamethasone (MAXITROL) 3.5-49963-2.1 SUSP ophthalmic susp; Place 1-2 drops into the right eye 3 times daily  Dispense: 1 Bottle; Refill: 1    3. Encounter for screening for cardiovascular disorders  - Lipid panel reflex to  direct LDL Fasting    4. Encounter for screening for diabetes mellitus  - BASIC METABOLIC PANEL    REKHA Morris [facility]

## 2018-09-18 ENCOUNTER — OFFICE VISIT (OUTPATIENT)
Dept: FAMILY MEDICINE | Facility: OTHER | Age: 38
End: 2018-09-18
Payer: COMMERCIAL

## 2018-09-18 VITALS
TEMPERATURE: 97.3 F | HEART RATE: 60 BPM | BODY MASS INDEX: 40.43 KG/M2 | SYSTOLIC BLOOD PRESSURE: 122 MMHG | HEIGHT: 74 IN | DIASTOLIC BLOOD PRESSURE: 104 MMHG | RESPIRATION RATE: 12 BRPM | WEIGHT: 315 LBS

## 2018-09-18 DIAGNOSIS — I10 UNCONTROLLED HYPERTENSION: Primary | ICD-10-CM

## 2018-09-18 DIAGNOSIS — Z13.6 ENCOUNTER FOR SCREENING FOR CARDIOVASCULAR DISORDERS: ICD-10-CM

## 2018-09-18 DIAGNOSIS — H10.401 CHRONIC CONJUNCTIVITIS OF RIGHT EYE, UNSPECIFIED CHRONIC CONJUNCTIVITIS TYPE: ICD-10-CM

## 2018-09-18 DIAGNOSIS — Z13.1 ENCOUNTER FOR SCREENING FOR DIABETES MELLITUS: ICD-10-CM

## 2018-09-18 LAB
ANION GAP SERPL CALCULATED.3IONS-SCNC: 6 MMOL/L (ref 3–14)
BUN SERPL-MCNC: 15 MG/DL (ref 7–30)
CALCIUM SERPL-MCNC: 9.2 MG/DL (ref 8.5–10.1)
CHLORIDE SERPL-SCNC: 105 MMOL/L (ref 94–109)
CHOLEST SERPL-MCNC: 208 MG/DL
CO2 SERPL-SCNC: 29 MMOL/L (ref 20–32)
CREAT SERPL-MCNC: 0.99 MG/DL (ref 0.66–1.25)
GFR SERPL CREATININE-BSD FRML MDRD: 85 ML/MIN/1.7M2
GLUCOSE SERPL-MCNC: 98 MG/DL (ref 70–99)
HDLC SERPL-MCNC: 40 MG/DL
LDLC SERPL CALC-MCNC: 128 MG/DL
NONHDLC SERPL-MCNC: 168 MG/DL
POTASSIUM SERPL-SCNC: 4.5 MMOL/L (ref 3.4–5.3)
SODIUM SERPL-SCNC: 140 MMOL/L (ref 133–144)
TRIGL SERPL-MCNC: 200 MG/DL

## 2018-09-18 PROCEDURE — 99214 OFFICE O/P EST MOD 30 MIN: CPT | Performed by: STUDENT IN AN ORGANIZED HEALTH CARE EDUCATION/TRAINING PROGRAM

## 2018-09-18 PROCEDURE — 36415 COLL VENOUS BLD VENIPUNCTURE: CPT | Performed by: STUDENT IN AN ORGANIZED HEALTH CARE EDUCATION/TRAINING PROGRAM

## 2018-09-18 PROCEDURE — 80048 BASIC METABOLIC PNL TOTAL CA: CPT | Performed by: STUDENT IN AN ORGANIZED HEALTH CARE EDUCATION/TRAINING PROGRAM

## 2018-09-18 PROCEDURE — 80061 LIPID PANEL: CPT | Performed by: STUDENT IN AN ORGANIZED HEALTH CARE EDUCATION/TRAINING PROGRAM

## 2018-09-18 RX ORDER — FLUTICASONE PROPIONATE 50 MCG
2 SPRAY, SUSPENSION (ML) NASAL DAILY
Qty: 1 BOTTLE | Refills: 6 | Status: SHIPPED | OUTPATIENT
Start: 2018-09-18

## 2018-09-18 RX ORDER — LISINOPRIL 40 MG/1
40 TABLET ORAL DAILY
Qty: 14 TABLET | Refills: 0 | Status: SHIPPED | OUTPATIENT
Start: 2018-09-18 | End: 2018-10-03

## 2018-09-18 RX ORDER — NEOMYCIN SULFATE, POLYMYXIN B SULFATE AND DEXAMETHASONE 3.5; 10000; 1 MG/ML; [USP'U]/ML; MG/ML
1-2 SUSPENSION/ DROPS OPHTHALMIC 3 TIMES DAILY
Qty: 1 BOTTLE | Refills: 1 | Status: SHIPPED | OUTPATIENT
Start: 2018-09-18 | End: 2018-10-03

## 2018-09-18 RX ORDER — LISINOPRIL 20 MG/1
20 TABLET ORAL DAILY
Qty: 30 TABLET | Refills: 1 | Status: CANCELLED | OUTPATIENT
Start: 2018-09-18

## 2018-09-18 NOTE — MR AVS SNAPSHOT
After Visit Summary   9/18/2018    Connor Bowman    MRN: [id number]           Patient Information     Date Of Birth          1980        Visit Information        Provider Department      9/18/2018 8:20 AM Ivet Jurado APRN [facility]        Today's Diagnoses     Screening for HIV (human immunodeficiency virus)    -  1    Hypertension, goal below 140/90        Acute sinusitis with symptoms > 10 days        Chronic conjunctivitis of right eye, unspecified chronic conjunctivitis type        Encounter for screening for cardiovascular disorders        Encounter for screening for diabetes mellitus          Care Instructions    Increase dose of lisinopril to 40 mg daily.    Stop by the clinic in 10-14 days for blood pressure check with a nurse only visit.    I will send in refills if your blood pressure is doing well.    Ivet Jurado NP-C            Follow-ups after your visit        Who to contact     If you have questions or need follow up information about today's clinic visit or your schedule please contact [facility] directly at [phone].  Normal or non-critical lab and imaging results will be communicated to you by MyChart, letter or phone within 4 business days after the clinic has received the results. If you do not hear from us within 7 days, please contact the clinic through MyChart or phone. If you have a critical or abnormal lab result, we will notify you by phone as soon as possible.  Submit refill requests through [email or web address] or call your pharmacy and they will forward the refill request to us. Please allow 3 business days for your refill to be completed.          Additional Information About Your Visit        Care EveryWhere ID     This is your Care EveryWhere ID. This could be used by other organizations to access your [place] medical records  [id number]        Your Vitals Were     Pulse Temperature Respirations Height BMI (Body  "Mass Index)       60 97.3  F (36.3  C) (Temporal) 12 6' 2.02\" (1.88 m) 41.57 kg/m2        Blood Pressure from Last 3 Encounters:   09/18/18 (!) 140/102   07/15/18 (!) 167/98   03/25/18 (!) 173/119    Weight from Last 3 Encounters:   09/18/18 323 lb 14.4 oz (146.9 kg)   07/15/18 324 lb (147 kg)   03/25/18 300 lb (136.1 kg)              We Performed the Following     BASIC METABOLIC PANEL     Lipid panel reflex to direct LDL Fasting          Today's Medication Changes          These changes are accurate as of 9/18/18  8:57 AM.  If you have any questions, ask your nurse or doctor.               These medicines have changed or have updated prescriptions.        Dose/Directions    * lisinopril 20 MG tablet   Commonly known as:  PRINIVIL/ZESTRIL   This may have changed:  Another medication with the same name was added. Make sure you understand how and when to take each.   Used for:  Hypertension, goal below 140/90   Changed by:  Ivet Jurado APRN CNP        Dose:  20 mg   Take 1 tablet (20 mg) by mouth daily   Quantity:  30 tablet   Refills:  1       * lisinopril 40 MG tablet   Commonly known as:  PRINIVIL/ZESTRIL   This may have changed:  You were already taking a medication with the same name, and this prescription was added. Make sure you understand how and when to take each.   Used for:  Hypertension, goal below 140/90   Changed by:  Ivet Jurado APRN CNP        Dose:  40 mg   Take 1 tablet (40 mg) by mouth daily   Quantity:  14 tablet   Refills:  0       * Notice:  This list has 2 medication(s) that are the same as other medications prescribed for you. Read the directions carefully, and ask your doctor or other care provider to review them with you.         Where to get your medicines      These medications were sent to [facility] - [address]  [address] [address]     Phone:  [phone]     fluticasone 50 MCG/ACT spray    " lisinopril 40 MG tablet    neomycin-polymyxin-dexamethasone 3.5-83602-3.1 Susp ophthalmic susp                Primary Care Provider Office Phone # Fax #    Dudley Fontana [phone] [address] [place] [address]        Equal Access to Services     [facility]: Hadii aad ku hadasho Soomaali, waaxda luqadaha, qaybta kaalmada adeegyada, waxay chelein qingmaylin browning dariannavid john . So [facility] [phone].    ATENCIÓN: Si habla español, tiene a winn disposición servicios gratuitos de asistencia lingüística. [facility] [phone].    We comply with applicable federal civil rights laws and [place] laws. We do not discriminate on the basis of race, color, national origin, age, disability, sex, sexual orientation, or gender identity.            Thank you!     Thank you for choosing [facility]  for your care. Our goal is always to provide you with excellent care. Hearing back from our patients is one way we can continue to improve our services. Please take a few minutes to complete the written survey that you may receive in the mail after your visit with us. Thank you!             Your Updated Medication List - Protect others around you: Learn how to safely use, store and throw away your medicines at www.disposemymeds.org.          This list is accurate as of 9/18/18  8:57 AM.  Always use your most recent med list.                   Brand Name Dispense Instructions for use Diagnosis    acetaminophen 500 MG tablet    TYLENOL    100 tablet    Take 2 tablets (1,000 mg) by mouth every 6 hours as needed for pain or fever        aspirin 325 MG tablet      Take 325 mg by mouth        fluticasone 50 MCG/ACT spray    FLONASE    1 Bottle    Spray 2 sprays into both nostrils daily    Acute sinusitis with symptoms > 10 days       ibuprofen 200 MG tablet    ADVIL/MOTRIN     Take 3 tablets (600 mg) by mouth every 6 hours as needed for pain or fever        * lisinopril 20 MG tablet     PRINIVIL/ZESTRIL    30 tablet    Take 1 tablet (20 mg) by mouth daily    Hypertension, goal below 140/90       * lisinopril 40 MG tablet    PRINIVIL/ZESTRIL    14 tablet    Take 1 tablet (40 mg) by mouth daily    Hypertension, goal below 140/90       neomycin-polymyxin-dexamethasone 3.5-38773-9.1 Susp ophthalmic susp    MAXITROL    1 Bottle    Place 1-2 drops into the right eye 3 times daily    Chronic conjunctivitis of right eye, unspecified chronic conjunctivitis type       OCUSOFT HYPOCHLOR Liqd      2 sprays 2 times daily OCuSOFT HypoChlor 0.02% Eyelid and Eyelash Spray 2 fl. OZ  Spray cotton applicator 2 times, rub gently 3 times along upper and then 3 times along lower lashes and then rub both lids. Let liquid air dry.  Use new applicator for other eye.    Dry eyes       propylene glycol 0.6 % Soln ophthalmic solution    SYSTANE BALANCE     Place 1 drop into both eyes 4 times daily Systane Balance    Dry eyes       SUMAtriptan 50 MG tablet    IMITREX    18 tablet    Take 1 tablet (50 mg) by mouth at onset of headache for migraine May repeat in 2 hours. Max 4 tablets/24 hours.    Other migraine without status migrainosus, intractable       * Notice:  This list has 2 medication(s) that are the same as other medications prescribed for you. Read the directions carefully, and ask your doctor or other care provider to review them with you.

## 2018-09-18 NOTE — LETTER
"[facility]  [address] [address] [phone]      September 19, 2018    Connor Bowman                                                                                                                     [address] [place] [address]          Hi Connor,    Your total cholesterol and LDL (\"bad\" cholesterol\") were elevated. Your HDL (which is your good cholesterol and is protective against heart disease) was 40 which is good. Having elevated blood pressure and cholesterol increases your risk for having a heart attack or stroke. I recommend working on exercising and eating a healthy diet. Below is detailed information on changes you can make to lower your cholesterol.    The best way to bring down your cholesterol is through exercise and watching your diet. I recommend exercising for 30 minutes 3-5 times per week where you get your heart rate up consistently for the 30 minutes. Limit foods high in cholesterol such as egg yolks, cheese, fatty meats such as coelho, ribs, steak, ground beef, hot dogs, sausage, butter, margarine, snack crackers, and fast food.     Increasing fiber in your diet can help lower cholesterol. Foods high in water-soluble fiber are best for helping decreasing cholesterol and include food such as fruits, vegetables, seeds, oat bran, ground flaxseed and barley. If you have difficulty increasing fiber by changing your diet, you may consider adding a fiber supplement such as Metamucil to your daily routine.    Increasing plant sterols and stanols in your diet may also help. Foods with plant sterols and stanols include fruits, vegetables, nuts, seeds, cereals, beans, and vegetable oils and butter spreads such as Benecol.     Increasing Omega-3 fatty acids can also help decrease your cholesterol.  Omega-3 supplements are also available over-the-counter. Foods high in Omega-3 include fish, nuts, flax and soy.    Other foods that may lower " cholesterol include garlic, artichokes, grapes, berries, and green or black tea.      We will recheck your levels at your next annual visit.      Thanks,    JONNY Ellis

## 2018-09-18 NOTE — PATIENT INSTRUCTIONS
Increase dose of lisinopril to 40 mg daily.    Stop by the clinic in 10-14 days for blood pressure check with a nurse only visit.    I will send in refills if your blood pressure is doing well.    ISELA EllisC

## 2018-10-03 ENCOUNTER — OFFICE VISIT (OUTPATIENT)
Dept: FAMILY MEDICINE | Facility: OTHER | Age: 38
End: 2018-10-03
Payer: COMMERCIAL

## 2018-10-03 VITALS
HEART RATE: 66 BPM | WEIGHT: 315 LBS | SYSTOLIC BLOOD PRESSURE: 132 MMHG | DIASTOLIC BLOOD PRESSURE: 78 MMHG | BODY MASS INDEX: 41.3 KG/M2 | TEMPERATURE: 97.8 F | RESPIRATION RATE: 10 BRPM

## 2018-10-03 DIAGNOSIS — I10 ESSENTIAL HYPERTENSION: ICD-10-CM

## 2018-10-03 PROCEDURE — 99213 OFFICE O/P EST LOW 20 MIN: CPT | Performed by: STUDENT IN AN ORGANIZED HEALTH CARE EDUCATION/TRAINING PROGRAM

## 2018-10-03 RX ORDER — LISINOPRIL AND HYDROCHLOROTHIAZIDE 12.5; 2 MG/1; MG/1
1 TABLET ORAL DAILY
Qty: 30 TABLET | Refills: 1 | Status: CANCELLED | OUTPATIENT
Start: 2018-10-03

## 2018-10-03 RX ORDER — LISINOPRIL 40 MG/1
40 TABLET ORAL DAILY
Qty: 90 TABLET | Refills: 3 | Status: SHIPPED | OUTPATIENT
Start: 2018-10-03

## 2018-10-03 ASSESSMENT — PAIN SCALES - GENERAL: PAINLEVEL: NO PAIN (0)

## 2018-10-03 NOTE — MR AVS SNAPSHOT
After Visit Summary   10/3/2018    Connor Bowman    MRN: [id number]           Patient Information     Date Of Birth          1980        Visit Information        Provider Department      10/3/2018 8:40 AM Ivet Jurado APRN CNP [facility]        Today's Diagnoses     Essential hypertension           Follow-ups after your visit        Who to contact     If you have questions or need follow up information about today's clinic visit or your schedule please contact [facility] directly at [phone].  Normal or non-critical lab and imaging results will be communicated to you by MyChart, letter or phone within 4 business days after the clinic has received the results. If you do not hear from us within 7 days, please contact the clinic through MyChart or phone. If you have a critical or abnormal lab result, we will notify you by phone as soon as possible.  Submit refill requests through [employer] or call your pharmacy and they will forward the refill request to us. Please allow 3 business days for your refill to be completed.          Additional Information About Your Visit        Care EveryWhere ID     This is your Care EveryWhere ID. This could be used by other organizations to access your [place] medical records  [id number]        Your Vitals Were     Pulse Temperature Respirations BMI (Body Mass Index)          66 97.8  F (36.6  C) (Temporal) 10 41.3 kg/m2         Blood Pressure from Last 3 Encounters:   10/03/18 132/78   09/18/18 (!) 122/104   07/15/18 (!) 167/98    Weight from Last 3 Encounters:   10/03/18 321 lb 12.8 oz (146 kg)   09/18/18 323 lb 14.4 oz (146.9 kg)   07/15/18 324 lb (147 kg)              Today, you had the following     No orders found for display         Today's Medication Changes          These changes are accurate as of 10/3/18  8:57 AM.  If you have any questions, ask your nurse or doctor.               These medicines have  changed or have updated prescriptions.        Dose/Directions    lisinopril 40 MG tablet   Commonly known as:  PRINIVIL/ZESTRIL   This may have changed:  Another medication with the same name was removed. Continue taking this medication, and follow the directions you see here.   Used for:  Essential hypertension   Changed by:  Ivet Jurado APRN CNP        Dose:  40 mg   Take 1 tablet (40 mg) by mouth daily   Quantity:  90 tablet   Refills:  3            Where to get your medicines      These medications were sent to [facility] - [address]   [address] [place] [address]     Phone:  [phone]     lisinopril 40 MG tablet                Primary Care Provider Office Phone # Fax #    Dudley Phil Fontana [phone] [address] [place] [address]        Equal Access to Services     [facility]: Hadii aad ku hadasho Soomaali, waaxda luqadaha, qaybta kaalmada adeegyada, waxay idiin hayaamaylin john . So [facility] [phone].    ATENCIÓN: Si habla español, tiene a winn disposición servicios gratuitos de asistencia lingüística. Llame al [phone].    We comply with applicable federal civil rights laws and [place] laws. We do not discriminate on the basis of race, color, national origin, age, disability, sex, sexual orientation, or gender identity.            Thank you!     Thank you for choosing [facility]  for your care. Our goal is always to provide you with excellent care. Hearing back from our patients is one way we can continue to improve our services. Please take a few minutes to complete the written survey that you may receive in the mail after your visit with us. Thank you!             Your Updated Medication List - Protect others around you: Learn how to safely use, store and throw away your medicines at www.disposemymeds.org.          This list is accurate as of 10/3/18  8:57 AM.  Always  use your most recent med list.                   Brand Name Dispense Instructions for use Diagnosis    aspirin 325 MG tablet      Take 325 mg by mouth        fluticasone 50 MCG/ACT spray    FLONASE    1 Bottle    Spray 2 sprays into both nostrils daily    Chronic conjunctivitis of right eye, unspecified chronic conjunctivitis type       lisinopril 40 MG tablet    PRINIVIL/ZESTRIL    90 tablet    Take 1 tablet (40 mg) by mouth daily    Essential hypertension       SUMAtriptan 50 MG tablet    IMITREX    18 tablet    Take 1 tablet (50 mg) by mouth at onset of headache for migraine May repeat in 2 hours. Max 4 tablets/24 hours.    Other migraine without status migrainosus, intractable

## 2018-10-03 NOTE — PROGRESS NOTES
SUBJECTIVE:   Connor Bowman is a 38 year old male who presents to clinic today for the following health issues:    HPI  Hypertension Follow-up      Outpatient blood pressures are being checked at home.  Results are low 130s/70s.    Low Salt Diet: no added salt    No side effects from medication.    Problem list and histories reviewed & adjusted, as indicated.  Additional history: as documented    BP Readings from Last 3 Encounters:   10/03/18 132/78   09/18/18 (!) 122/104   07/15/18 (!) 167/98    Wt Readings from Last 3 Encounters:   10/03/18 321 lb 12.8 oz (146 kg)   09/18/18 323 lb 14.4 oz (146.9 kg)   07/15/18 324 lb (147 kg)                  Labs reviewed in EPIC    ROS:  Constitutional, HEENT, cardiovascular, pulmonary, gi and gu systems are negative, except as otherwise noted.    OBJECTIVE:     /78  Pulse 66  Temp 97.8  F (36.6  C) (Temporal)  Resp 10  Wt 321 lb 12.8 oz (146 kg)  BMI 41.3 kg/m2  Body mass index is 41.3 kg/(m^2).  GENERAL: healthy, alert and no distress  RESP: lungs clear to auscultation - no rales, rhonchi or wheezes  CV: regular rate and rhythm, normal S1 S2, no S3 or S4, no murmur, click or rub  MS: no gross musculoskeletal defects noted, no edema    Diagnostic Test Results:  none     ASSESSMENT/PLAN:     1. Essential hypertension  Improved. Refills granted. Follow up if BP creeps up to 140s-150s consistently.  - lisinopril (PRINIVIL/ZESTRIL) 40 MG tablet; Take 1 tablet (40 mg) by mouth daily  Dispense: 90 tablet; Refill: 3    REKHA Morris [facility]

## 2018-11-18 ENCOUNTER — OFFICE VISIT (OUTPATIENT)
Dept: URGENT CARE | Facility: RETAIL CLINIC | Age: 38
End: 2018-11-18
Payer: COMMERCIAL

## 2018-11-18 VITALS — DIASTOLIC BLOOD PRESSURE: 82 MMHG | HEART RATE: 83 BPM | SYSTOLIC BLOOD PRESSURE: 146 MMHG | TEMPERATURE: 98.3 F

## 2018-11-18 DIAGNOSIS — J01.90 ACUTE SINUSITIS WITH SYMPTOMS > 10 DAYS: Primary | ICD-10-CM

## 2018-11-18 PROCEDURE — 99213 OFFICE O/P EST LOW 20 MIN: CPT | Performed by: PHYSICIAN ASSISTANT

## 2018-11-18 NOTE — MR AVS SNAPSHOT
After Visit Summary   11/18/2018    Connor Bowman    MRN: [id number]           Patient Information     Date Of Birth          1980        Visit Information        Provider Department      11/18/2018 12:40 PM Taylor Ayala PA-C [facility]        Today's Diagnoses     Acute sinusitis with symptoms > 10 days    -  1      Care Instructions    Take antibiotic as directed  Use flonase nasal spray daily  Apply warm facial compresses/packs for 5-10 minutes three times daily.  Drink plenty of fluids- 6 to 10 glasses of liquids each day. Rest.  Saline drops or nasal sprays as needed.   May use netti pot as needed. Do not use tap water. May use filtered or distilled water.  Steam treatments or humidifier.  Mucinex (guaifenesin) to thin out secretions.  Tylenol or ibuprofen as needed for pain or fever  Follow up at your primary care clinic for increasing pain, high fever, vision changes, worsening symptoms, or no relief from symptoms after 7-10 days.  Please follow up with primary care provider if not improving, worsening or new symptoms or for any adverse reactions to medications.             Follow-ups after your visit        Who to contact     You can reach your care team any time of the day by calling [phone].  Notification of test results:  If you have an abnormal lab result, we will notify you by phone as soon as possible.         Additional Information About Your Visit        Care EveryWhere ID     This is your Care EveryWhere ID. This could be used by other organizations to access your [place] medical records  [id number]        Your Vitals Were     Pulse Temperature                83 98.3  F (36.8  C) (Oral)           Blood Pressure from Last 3 Encounters:   11/18/18 146/82   10/03/18 132/78   09/18/18 (!) 122/104    Weight from Last 3 Encounters:   10/03/18 321 lb 12.8 oz (146 kg)   09/18/18 323 lb 14.4 oz (146.9 kg)   07/15/18 324 lb (147 kg)               Today, you had the following     No orders found for display         Today's Medication Changes          These changes are accurate as of 11/18/18  1:04 PM.  If you have any questions, ask your nurse or doctor.               Start taking these medicines.        Dose/Directions    amoxicillin-clavulanate 875-125 MG per tablet   Commonly known as:  AUGMENTIN   Used for:  Acute sinusitis with symptoms > 10 days        Dose:  1 tablet   Take 1 tablet by mouth 2 times daily for 7 days   Quantity:  14 tablet   Refills:  0            Where to get your medicines      These medications were sent to [id number] - [place], [place] - [address]  [address] [address] [place] [address]     Phone:  [phone]     amoxicillin-clavulanate 875-125 MG per tablet                Primary Care Provider Office Phone # Fax #    Dudley Fontana [phone] [address] [place] [address]        Equal Access to Services     [facility]: Hadii aad ku hadasho Soomaali, waaxda luqadaha, qaybta kaalmada adeegyada, waxay idiin hayrafaeln nallely john . So [facility] [phone].    ATENCIÓN: Si habla español, tiene a winn disposición servicios gratuitos de asistencia lingüística. [facility] [phone].    We comply with applicable federal civil rights laws and [place] laws. We do not discriminate on the basis of race, color, national origin, age, disability, sex, sexual orientation, or gender identity.            Thank you!     Thank you for choosing [facility]  for your care. Our goal is always to provide you with excellent care. Hearing back from our patients is one way we can continue to improve our services. Please take a few minutes to complete the written survey that you may receive in the mail after your visit with us. Thank you!             Your Updated Medication List - Protect others around you: Learn how to safely use, store and throw away your medicines at  www.disposemymeds.org.          This list is accurate as of 11/18/18  1:04 PM.  Always use your most recent med list.                   Brand Name Dispense Instructions for use Diagnosis    amoxicillin-clavulanate 875-125 MG per tablet    AUGMENTIN    14 tablet    Take 1 tablet by mouth 2 times daily for 7 days    Acute sinusitis with symptoms > 10 days       aspirin 325 MG tablet      Take 325 mg by mouth        fluticasone 50 MCG/ACT spray    FLONASE    1 Bottle    Spray 2 sprays into both nostrils daily    Chronic conjunctivitis of right eye, unspecified chronic conjunctivitis type       lisinopril 40 MG tablet    PRINIVIL/ZESTRIL    90 tablet    Take 1 tablet (40 mg) by mouth daily    Essential hypertension       SUMAtriptan 50 MG tablet    IMITREX    18 tablet    Take 1 tablet (50 mg) by mouth at onset of headache for migraine May repeat in 2 hours. Max 4 tablets/24 hours.    Other migraine without status migrainosus, intractable

## 2018-11-18 NOTE — PATIENT INSTRUCTIONS
Take antibiotic as directed  Use flonase nasal spray daily  Apply warm facial compresses/packs for 5-10 minutes three times daily.  Drink plenty of fluids- 6 to 10 glasses of liquids each day. Rest.  Saline drops or nasal sprays as needed.   May use netti pot as needed. Do not use tap water. May use filtered or distilled water.  Steam treatments or humidifier.  Mucinex (guaifenesin) to thin out secretions.  Tylenol or ibuprofen as needed for pain or fever  Follow up at your primary care clinic for increasing pain, high fever, vision changes, worsening symptoms, or no relief from symptoms after 7-10 days.  Please follow up with primary care provider if not improving, worsening or new symptoms or for any adverse reactions to medications.

## 2018-11-18 NOTE — PROGRESS NOTES
Chief Complaint   Patient presents with     Sinus Problem     cold for 2 weeks, 2 days now pressure in head and behind eyes, mucus yellow to dark green x 2-3 days, no fevers       SUBJECTIVE:  Connor Bowman is a 38 year old male here with concerns about sinus infection.  He states onset of symptoms were 2 week(s) ago, worse in past 2 days, started as a cold first  He has had maxillary pressure. Course of illness is worsening. Severity moderate  Current and Associated symptoms: nasal congestion, rhinorrhea, facial pain/pressure, R ear pressure and headache  Predisposing factors include HX of sinusitis and recent illness. Recent treatment has included: flonase, saline rinse    Past Medical History:   Diagnosis Date     GERD (gastroesophageal reflux disease)      Unspecified essential hypertension      Current Outpatient Prescriptions   Medication Sig Dispense Refill     fluticasone (FLONASE) 50 MCG/ACT spray Spray 2 sprays into both nostrils daily 1 Bottle 6     lisinopril (PRINIVIL/ZESTRIL) 40 MG tablet Take 1 tablet (40 mg) by mouth daily 90 tablet 3     SUMAtriptan (IMITREX) 50 MG tablet Take 1 tablet (50 mg) by mouth at onset of headache for migraine May repeat in 2 hours. Max 4 tablets/24 hours. 18 tablet 1     aspirin 325 MG tablet Take 325 mg by mouth        No Known Allergies     History   Smoking Status     Never Smoker   Smokeless Tobacco     Never Used       ROS:  CONSTITUTIONAL:NEGATIVE for fever, chills  ENT/MOUTH: POSITIVE for nasal congestion, rhinorrhea-purulent and sinus pressure  RESP:NEGATIVE for significant cough or wheezing    OBJECTIVE:  /82 (BP Location: Left arm)  Pulse 83  Temp 98.3  F (36.8  C) (Oral)  Exam:GENERAL APPEARANCE: healthy, alert and no distress  EYES:  conjunctiva clear  HENT: R TM gray, serous effusion, L TM gray, neutral position nasal turbinates erythematous, swollen, rhinorrhea yellow, oral mucous membranes moist, no erythema noted, maxillary sinus tenderness. Post  nasal drainage noted in the pharynx.  NECK: supple, nontender, no lymphadenopathy  RESP: lungs clear to auscultation - no rales, rhonchi or wheezes  CV: regular rates and rhythm, normal S1 S2, no murmur noted  SKIN: no suspicious lesions or rashes    ASSESSMENT:  (J01.90) Acute sinusitis with symptoms > 10 days  (primary encounter diagnosis)    PLAN:  Plan: amoxicillin-clavulanate (AUGMENTIN) 875-125 MG per tablet 1 tab BID x 7 days  Take antibiotic as directed  Apply warm facial compresses/packs for 5-10 minutes three times daily.  Drink plenty of fluids- 6 to 10 glasses of liquids each day. Rest.  Saline drops or nasal sprays as needed.   May use netti pot as needed. Do not use tap water. May use filtered or distilled water.  Steam treatments or humidifier.  Mucinex (guaifenesin) to thin out secretions.  Tylenol or ibuprofen as needed for pain or fever  Follow up at your primary care clinic for increasing pain, high fever, vision changes, worsening symptoms, or no relief from symptoms after 7-10 days.  Please follow up with primary care provider if not improving, worsening or new symptoms or for any adverse reactions to medications.     Taylor Ayala PA-C  [facility]

## 2018-11-20 ENCOUNTER — TELEPHONE (OUTPATIENT)
Dept: FAMILY MEDICINE | Facility: OTHER | Age: 38
End: 2018-11-20

## 2018-11-20 DIAGNOSIS — R11.0 NAUSEA: Primary | ICD-10-CM

## 2018-11-20 NOTE — TELEPHONE ENCOUNTER
Reason for Call:  Other prescription    Detailed comments: Pt is being treated for a sinus infection with Augmentin and states it is working but it is giving him an upset stomach.  Connor is asking for a prescription for Zofran.    [facility]    Phone Number Patient can be reached at: Home number on file [phone] (home)    Best Time: any    Can we leave a detailed message on this number? YES    Call taken on 11/20/2018 at 9:02 AM by Soniya Trevino

## 2018-11-20 NOTE — TELEPHONE ENCOUNTER
I have not seen this patient well over a year.  We will have to defer to the provider who last saw the patient.

## 2018-11-21 RX ORDER — ONDANSETRON 4 MG/1
4 TABLET, FILM COATED ORAL EVERY 8 HOURS PRN
Qty: 10 TABLET | Refills: 0 | Status: SHIPPED | OUTPATIENT
Start: 2018-11-21

## 2018-11-21 NOTE — TELEPHONE ENCOUNTER
Please call patient and let him know I sent in a small prescription for Zofran.  Ivet Jurado, PARVIZ-C

## 2018-11-23 NOTE — TELEPHONE ENCOUNTER
LMTC, please see if patient is still having symptoms, and notify him that Rx for Zofran has been sent to [facility] in [place] we can send it to [facility] pharmacy if he prefers   Thanks  Kelsy Olvera RT (R)

## 2018-11-26 NOTE — TELEPHONE ENCOUNTER
Spoke with patient, he has picked up Rx for Zofran, no further questions  Closing encounter  Kelsy Olvera RT (R)

## 2019-06-14 ENCOUNTER — TELEPHONE (OUTPATIENT)
Dept: FAMILY MEDICINE | Facility: OTHER | Age: 39
End: 2019-06-14

## 2019-06-14 NOTE — TELEPHONE ENCOUNTER
Summary:    Patient is due/failing the following:   BP CHECK    Action needed:   Patient needs nurse only appointment.    Type of outreach:    Sent letter.    Questions for provider review:    None                                                                                                                                    Kailee eSvilla       Chart routed to Care Team .          Panel Management Review      Patient has the following on his problem list:     Hypertension   Last three blood pressure readings:  BP Readings from Last 3 Encounters:   11/18/18 146/82   10/03/18 132/78   09/18/18 (!) 122/104     Blood pressure: FAILED    HTN Guidelines:  Less than 140/90      Composite cancer screening  Chart review shows that this patient is due/due soon for the following None

## 2019-06-14 NOTE — LETTER
[facility]  [address] [place] [address]  Phone: [phone]  June 14, 2019      Connor Bowman  [address] [facility] [address]      Dear Connor,    We care about your health and have reviewed your health plan including your medical conditions, medications, and lab results.  Based on this review, it is recommended that you follow up regarding the following health topic(s):  -High Blood Pressure    We recommend you take the following action(s):  -schedule a FREE FLOAT MA-ONLY BLOOD PRESSURE APPOINTMENT within the next 1-4 weeks.     Please call us at the [facility] - [phone] (or use [employer]) to address the above recommendations.     Thank you for trusting [facility] and we appreciate the opportunity to serve you.  We look forward to supporting your healthcare needs in the future.    Healthy Regards,    Your Health Care Team  [facility]